# Patient Record
Sex: MALE | Race: AMERICAN INDIAN OR ALASKA NATIVE | NOT HISPANIC OR LATINO | URBAN - METROPOLITAN AREA
[De-identification: names, ages, dates, MRNs, and addresses within clinical notes are randomized per-mention and may not be internally consistent; named-entity substitution may affect disease eponyms.]

---

## 2017-04-12 ENCOUNTER — OUTPATIENT (OUTPATIENT)
Dept: OUTPATIENT SERVICES | Facility: HOSPITAL | Age: 67
LOS: 1 days | Discharge: HOME | End: 2017-04-12

## 2017-06-27 DIAGNOSIS — I25.10 ATHEROSCLEROTIC HEART DISEASE OF NATIVE CORONARY ARTERY WITHOUT ANGINA PECTORIS: ICD-10-CM

## 2017-06-27 DIAGNOSIS — E11.9 TYPE 2 DIABETES MELLITUS WITHOUT COMPLICATIONS: ICD-10-CM

## 2017-06-27 DIAGNOSIS — K76.0 FATTY (CHANGE OF) LIVER, NOT ELSEWHERE CLASSIFIED: ICD-10-CM

## 2017-06-27 DIAGNOSIS — E11.8 TYPE 2 DIABETES MELLITUS WITH UNSPECIFIED COMPLICATIONS: ICD-10-CM

## 2017-06-27 DIAGNOSIS — E78.00 PURE HYPERCHOLESTEROLEMIA, UNSPECIFIED: ICD-10-CM

## 2017-08-16 ENCOUNTER — OUTPATIENT (OUTPATIENT)
Dept: OUTPATIENT SERVICES | Facility: HOSPITAL | Age: 67
LOS: 1 days | Discharge: HOME | End: 2017-08-16

## 2017-10-13 ENCOUNTER — OUTPATIENT (OUTPATIENT)
Dept: OUTPATIENT SERVICES | Facility: HOSPITAL | Age: 67
LOS: 1 days | Discharge: HOME | End: 2017-10-13

## 2017-10-13 DIAGNOSIS — I42.9 CARDIOMYOPATHY, UNSPECIFIED: ICD-10-CM

## 2017-10-13 DIAGNOSIS — E11.8 TYPE 2 DIABETES MELLITUS WITH UNSPECIFIED COMPLICATIONS: ICD-10-CM

## 2017-10-13 DIAGNOSIS — R73.09 OTHER ABNORMAL GLUCOSE: ICD-10-CM

## 2018-02-05 ENCOUNTER — OUTPATIENT (OUTPATIENT)
Dept: OUTPATIENT SERVICES | Facility: HOSPITAL | Age: 68
LOS: 1 days | Discharge: HOME | End: 2018-02-05

## 2018-02-05 DIAGNOSIS — E11.8 TYPE 2 DIABETES MELLITUS WITH UNSPECIFIED COMPLICATIONS: ICD-10-CM

## 2018-02-05 DIAGNOSIS — R53.83 OTHER FATIGUE: ICD-10-CM

## 2018-02-05 DIAGNOSIS — E78.2 MIXED HYPERLIPIDEMIA: ICD-10-CM

## 2018-02-05 DIAGNOSIS — E66.09 OTHER OBESITY DUE TO EXCESS CALORIES: ICD-10-CM

## 2018-05-31 ENCOUNTER — OUTPATIENT (OUTPATIENT)
Dept: OUTPATIENT SERVICES | Facility: HOSPITAL | Age: 68
LOS: 1 days | Discharge: HOME | End: 2018-05-31

## 2018-05-31 DIAGNOSIS — E11.8 TYPE 2 DIABETES MELLITUS WITH UNSPECIFIED COMPLICATIONS: ICD-10-CM

## 2018-09-20 ENCOUNTER — OUTPATIENT (OUTPATIENT)
Dept: OUTPATIENT SERVICES | Facility: HOSPITAL | Age: 68
LOS: 1 days | Discharge: HOME | End: 2018-09-20

## 2018-09-20 DIAGNOSIS — R94.6 ABNORMAL RESULTS OF THYROID FUNCTION STUDIES: ICD-10-CM

## 2018-09-20 DIAGNOSIS — R73.09 OTHER ABNORMAL GLUCOSE: ICD-10-CM

## 2018-09-20 DIAGNOSIS — R68.89 OTHER GENERAL SYMPTOMS AND SIGNS: ICD-10-CM

## 2018-09-20 DIAGNOSIS — N39.0 URINARY TRACT INFECTION, SITE NOT SPECIFIED: ICD-10-CM

## 2018-09-20 DIAGNOSIS — E78.5 HYPERLIPIDEMIA, UNSPECIFIED: ICD-10-CM

## 2018-09-20 DIAGNOSIS — I42.9 CARDIOMYOPATHY, UNSPECIFIED: ICD-10-CM

## 2018-10-17 ENCOUNTER — EMERGENCY (EMERGENCY)
Facility: HOSPITAL | Age: 68
LOS: 0 days | Discharge: HOME | End: 2018-10-17
Admitting: INTERNAL MEDICINE

## 2018-10-17 ENCOUNTER — TRANSCRIPTION ENCOUNTER (OUTPATIENT)
Age: 68
End: 2018-10-17

## 2018-10-17 ENCOUNTER — INPATIENT (INPATIENT)
Facility: HOSPITAL | Age: 68
LOS: 0 days | Discharge: HOME | End: 2018-10-17
Attending: INTERNAL MEDICINE | Admitting: INTERNAL MEDICINE
Payer: MEDICARE

## 2018-10-17 VITALS
SYSTOLIC BLOOD PRESSURE: 186 MMHG | DIASTOLIC BLOOD PRESSURE: 83 MMHG | WEIGHT: 205.03 LBS | RESPIRATION RATE: 18 BRPM | OXYGEN SATURATION: 98 % | TEMPERATURE: 97 F | HEART RATE: 78 BPM | HEIGHT: 66 IN

## 2018-10-17 VITALS
RESPIRATION RATE: 17 BRPM | HEART RATE: 89 BPM | SYSTOLIC BLOOD PRESSURE: 179 MMHG | OXYGEN SATURATION: 95 % | DIASTOLIC BLOOD PRESSURE: 84 MMHG | TEMPERATURE: 98 F

## 2018-10-17 DIAGNOSIS — R04.0 EPISTAXIS: ICD-10-CM

## 2018-10-17 DIAGNOSIS — I10 ESSENTIAL (PRIMARY) HYPERTENSION: ICD-10-CM

## 2018-10-17 DIAGNOSIS — Z79.84 LONG TERM (CURRENT) USE OF ORAL HYPOGLYCEMIC DRUGS: ICD-10-CM

## 2018-10-17 DIAGNOSIS — I25.10 ATHEROSCLEROTIC HEART DISEASE OF NATIVE CORONARY ARTERY WITHOUT ANGINA PECTORIS: ICD-10-CM

## 2018-10-17 DIAGNOSIS — Z95.1 PRESENCE OF AORTOCORONARY BYPASS GRAFT: ICD-10-CM

## 2018-10-17 DIAGNOSIS — E78.00 PURE HYPERCHOLESTEROLEMIA, UNSPECIFIED: ICD-10-CM

## 2018-10-17 DIAGNOSIS — Z79.82 LONG TERM (CURRENT) USE OF ASPIRIN: ICD-10-CM

## 2018-10-17 DIAGNOSIS — E11.9 TYPE 2 DIABETES MELLITUS WITHOUT COMPLICATIONS: ICD-10-CM

## 2018-10-17 DIAGNOSIS — Z95.1 PRESENCE OF AORTOCORONARY BYPASS GRAFT: Chronic | ICD-10-CM

## 2018-10-17 LAB
ANION GAP SERPL CALC-SCNC: 14 MMOL/L — SIGNIFICANT CHANGE UP (ref 7–14)
APTT BLD: 39.8 SEC — HIGH (ref 27–39.2)
BASOPHILS # BLD AUTO: 0.02 K/UL — SIGNIFICANT CHANGE UP (ref 0–0.2)
BASOPHILS NFR BLD AUTO: 0.3 % — SIGNIFICANT CHANGE UP (ref 0–1)
BLD GP AB SCN SERPL QL: SIGNIFICANT CHANGE UP
BUN SERPL-MCNC: 20 MG/DL — SIGNIFICANT CHANGE UP (ref 10–20)
CALCIUM SERPL-MCNC: 9.2 MG/DL — SIGNIFICANT CHANGE UP (ref 8.5–10.1)
CHLORIDE SERPL-SCNC: 98 MMOL/L — SIGNIFICANT CHANGE UP (ref 98–110)
CO2 SERPL-SCNC: 26 MMOL/L — SIGNIFICANT CHANGE UP (ref 17–32)
CREAT SERPL-MCNC: 1.3 MG/DL — SIGNIFICANT CHANGE UP (ref 0.7–1.5)
EOSINOPHIL # BLD AUTO: 0.27 K/UL — SIGNIFICANT CHANGE UP (ref 0–0.7)
EOSINOPHIL NFR BLD AUTO: 3.6 % — SIGNIFICANT CHANGE UP (ref 0–8)
GLUCOSE BLDC GLUCOMTR-MCNC: 139 MG/DL — HIGH (ref 70–99)
GLUCOSE SERPL-MCNC: 108 MG/DL — HIGH (ref 70–99)
HCT VFR BLD CALC: 39.2 % — LOW (ref 42–52)
HCT VFR BLD CALC: 39.3 % — LOW (ref 42–52)
HGB BLD-MCNC: 12.7 G/DL — LOW (ref 14–18)
HGB BLD-MCNC: 12.8 G/DL — LOW (ref 14–18)
IMM GRANULOCYTES NFR BLD AUTO: 0.4 % — HIGH (ref 0.1–0.3)
INR BLD: 1.06 RATIO — SIGNIFICANT CHANGE UP (ref 0.65–1.3)
LYMPHOCYTES # BLD AUTO: 1.01 K/UL — LOW (ref 1.2–3.4)
LYMPHOCYTES # BLD AUTO: 13.5 % — LOW (ref 20.5–51.1)
MCHC RBC-ENTMCNC: 26.7 PG — LOW (ref 27–31)
MCHC RBC-ENTMCNC: 27.5 PG — SIGNIFICANT CHANGE UP (ref 27–31)
MCHC RBC-ENTMCNC: 32.3 G/DL — SIGNIFICANT CHANGE UP (ref 32–37)
MCHC RBC-ENTMCNC: 32.7 G/DL — SIGNIFICANT CHANGE UP (ref 32–37)
MCV RBC AUTO: 82.6 FL — SIGNIFICANT CHANGE UP (ref 80–94)
MCV RBC AUTO: 84.1 FL — SIGNIFICANT CHANGE UP (ref 80–94)
MONOCYTES # BLD AUTO: 0.85 K/UL — HIGH (ref 0.1–0.6)
MONOCYTES NFR BLD AUTO: 11.4 % — HIGH (ref 1.7–9.3)
NEUTROPHILS # BLD AUTO: 5.28 K/UL — SIGNIFICANT CHANGE UP (ref 1.4–6.5)
NEUTROPHILS NFR BLD AUTO: 70.8 % — SIGNIFICANT CHANGE UP (ref 42.2–75.2)
NRBC # BLD: 0 /100 WBCS — SIGNIFICANT CHANGE UP (ref 0–0)
PLATELET # BLD AUTO: 183 K/UL — SIGNIFICANT CHANGE UP (ref 130–400)
PLATELET # BLD AUTO: 187 K/UL — SIGNIFICANT CHANGE UP (ref 130–400)
POTASSIUM SERPL-MCNC: 4.7 MMOL/L — SIGNIFICANT CHANGE UP (ref 3.5–5)
POTASSIUM SERPL-SCNC: 4.7 MMOL/L — SIGNIFICANT CHANGE UP (ref 3.5–5)
PROTHROM AB SERPL-ACNC: 12.2 SEC — SIGNIFICANT CHANGE UP (ref 9.95–12.87)
RBC # BLD: 4.66 M/UL — LOW (ref 4.7–6.1)
RBC # BLD: 4.76 M/UL — SIGNIFICANT CHANGE UP (ref 4.7–6.1)
RBC # FLD: 14.1 % — SIGNIFICANT CHANGE UP (ref 11.5–14.5)
RBC # FLD: 14.3 % — SIGNIFICANT CHANGE UP (ref 11.5–14.5)
SODIUM SERPL-SCNC: 138 MMOL/L — SIGNIFICANT CHANGE UP (ref 135–146)
TYPE + AB SCN PNL BLD: SIGNIFICANT CHANGE UP
WBC # BLD: 7.46 K/UL — SIGNIFICANT CHANGE UP (ref 4.8–10.8)
WBC # BLD: 8.43 K/UL — SIGNIFICANT CHANGE UP (ref 4.8–10.8)
WBC # FLD AUTO: 7.46 K/UL — SIGNIFICANT CHANGE UP (ref 4.8–10.8)
WBC # FLD AUTO: 8.43 K/UL — SIGNIFICANT CHANGE UP (ref 4.8–10.8)

## 2018-10-17 PROCEDURE — 99283 EMERGENCY DEPT VISIT LOW MDM: CPT

## 2018-10-17 RX ORDER — PHENYLEPHRINE HCL 0.25 %
1 AEROSOL, SPRAY WITH PUMP (ML) NASAL ONCE
Qty: 0 | Refills: 0 | Status: COMPLETED | OUTPATIENT
Start: 2018-10-17 | End: 2018-10-17

## 2018-10-17 RX ORDER — LOSARTAN POTASSIUM 100 MG/1
100 TABLET, FILM COATED ORAL DAILY
Qty: 0 | Refills: 0 | Status: DISCONTINUED | OUTPATIENT
Start: 2018-10-17 | End: 2018-10-17

## 2018-10-17 RX ORDER — METFORMIN HYDROCHLORIDE 850 MG/1
500 TABLET ORAL
Qty: 0 | Refills: 0 | Status: DISCONTINUED | OUTPATIENT
Start: 2018-10-17 | End: 2018-10-17

## 2018-10-17 RX ORDER — ACEBUTOLOL HCL 200 MG
1 CAPSULE ORAL
Qty: 0 | Refills: 0 | COMMUNITY

## 2018-10-17 RX ORDER — HYDROCHLOROTHIAZIDE 25 MG
12.5 TABLET ORAL DAILY
Qty: 0 | Refills: 0 | Status: DISCONTINUED | OUTPATIENT
Start: 2018-10-17 | End: 2018-10-17

## 2018-10-17 RX ORDER — ASPIRIN/CALCIUM CARB/MAGNESIUM 324 MG
0 TABLET ORAL
Qty: 0 | Refills: 0 | COMMUNITY

## 2018-10-17 RX ORDER — ATORVASTATIN CALCIUM 80 MG/1
40 TABLET, FILM COATED ORAL AT BEDTIME
Qty: 0 | Refills: 0 | Status: DISCONTINUED | OUTPATIENT
Start: 2018-10-17 | End: 2018-10-17

## 2018-10-17 RX ORDER — TRANEXAMIC ACID 100 MG/ML
1000 INJECTION, SOLUTION INTRAVENOUS ONCE
Qty: 0 | Refills: 0 | Status: COMPLETED | OUTPATIENT
Start: 2018-10-17 | End: 2018-10-17

## 2018-10-17 RX ORDER — ASPIRIN/CALCIUM CARB/MAGNESIUM 324 MG
1 TABLET ORAL
Qty: 0 | Refills: 0 | COMMUNITY

## 2018-10-17 RX ADMIN — TRANEXAMIC ACID 220 MILLIGRAM(S): 100 INJECTION, SOLUTION INTRAVENOUS at 02:10

## 2018-10-17 RX ADMIN — Medication 1 TABLET(S): at 05:22

## 2018-10-17 RX ADMIN — Medication 1 SPRAY(S): at 01:57

## 2018-10-17 NOTE — ED PROVIDER NOTE - MEDICAL DECISION MAKING DETAILS
Patient presented with epistaxis requiring 7.5 double baloon rhinorocket. Patient admitted to the hospital for further management and care. ENT on board.

## 2018-10-17 NOTE — DISCHARGE NOTE ADULT - CARE PROVIDER_API CALL
Berenice Oates), [facility]  [address] [place] [address]  [address] [place] [address]  Phone: [phone]  Fax: [phone]

## 2018-10-17 NOTE — H&P ADULT - ASSESSMENT
69 yo M with pmhx of CAD s/p CABG, DM, HTN presents for L nare epistaxis s/p forceful blowing of nose one hour prior to presentation    # L nare epistaxis s/p packing by ENT  - good hemostasis with the current packing.  - H/H stable  - posterior packing deflated and anterior remains  - follow up with ENT as outpatient on Friday  - c/w po augmentin X 3 day while packing is in, follow up ENT  - hold ASA for now    # CAD s/p CABG  - c/w BB and       # DM  - c/w metformin    # HTN  - c/w losartan and HCTZ    # DVT ppx; hold pharmacological DVT ppx   - no need for GI ppx

## 2018-10-17 NOTE — CHART NOTE - NSCHARTNOTEFT_GEN_A_CORE
68y old Male presented to ER with L nare bleeding which began after he blew his nose. +ASA. This has never happened in the past. Pt also intermittently spitting up some clots. In ER, 7.5cm rhino rocket coated in TXA was placed but pt continued to bleed through packing so a 7.5cm anterior/posterior rhino rocket was placed and inflated with H2O. Pt tolerated well.    Vital Signs Last 24 Hrs  T(C): 36.1 (17 Oct 2018 03:15), Max: 36.1 (17 Oct 2018 03:15)  T(F): 97 (17 Oct 2018 03:15), Max: 97 (17 Oct 2018 03:15)  HR: 70 (17 Oct 2018 03:15) (70 - 78)  BP: 154/80 (17 Oct 2018 03:15) (154/80 - 186/83)  BP(mean): --  RR: 18 (17 Oct 2018 03:15) (18 - 18)  SpO2: 98% (17 Oct 2018 03:15) (98% - 98%)                        12.8   8.43  )-----------( 187      ( 17 Oct 2018 02:28 )             39.2     10-17    138  |  98  |  20  ----------------------------<  108<H>  4.7   |  26  |  1.3    Ca    9.2      17 Oct 2018 02:28      PT/INR - ( 17 Oct 2018 02:28 )   PT: 12.20 sec;   INR: 1.06 ratio         PTT - ( 17 Oct 2018 02:28 )  PTT:39.8 sec  Daily Height in cm: 167.64 (17 Oct 2018 00:36)    Daily       Hypercholesteremia  Diabetes  HTN (hypertension)    Marital Status:  (   )    (   ) Single    (   )    (  )    Lives with: (  ) alone  (  ) children   (  ) spouse   (  ) parents  (  ) other  Substance Use (street drugs): (  ) never used  (  ) other:  Tobacco Usage:  (   ) never smoked   (   ) former smoker   (   ) current smoker  (     ) pack year, see H&P  Alcohol Usage: see H&P  Sexual History: see H&P    REVIEW OF SYSTEMS:  CONSTITUTIONAL: No weakness, fevers or chills  EYES/ENT: (+) nose bleed No visual changes;  No vertigo or throat pain   NECK: No pain or stiffness  RESPIRATORY: No cough, wheezing, hemoptysis; No shortness of breath  CARDIOVASCULAR: No chest pain or palpitations  GASTROINTESTINAL: No abdominal or epigastric pain. No nausea, vomiting, or hematemesis; No diarrhea or constipation. No melena or hematochezia.  GENITOURINARY: No dysuria, frequency or hematuria  NEUROLOGICAL: No numbness or weakness  SKIN: No itching, rashes    Physical Exam:  General: WN/WD NAD  Neurology: A&Ox3, nonfocal, follows commands  Eyes: PERRLA/ EOMI  ENT/Neck: Neck supple, trachea midline, No JVD, (+) L nasal rhinorocket packing  Respiratory: CTA B/L, No wheezing, rales, rhonchi  CV: Normal rate regular rhythm, S1S2, no murmurs, rubs or gallops  Abdominal: Soft, NT, ND +BS,   Extremities: No edema, + peripheral pulses  Skin: No Rashes, Hematoma, Ecchymosis  Incisions:   Tubes:    EKG:  CXR:   Type and Screen:     A/P  Epistaxis on ASA s/p posterior nasal packing   -hold ASA  -ENT follow up   -repeat CBC    DM/Hypercholersterol/HTN   -stable on current outpatient Rx    SCD to LE if in bed    -IF cleared by ENT can be discharged to F/U w/ ENT and PMD 68y old Male presented to ER with L nare bleeding which began after he blew his nose. +ASA. This has never happened in the past. Pt also intermittently spitting up some clots. In ER, 7.5cm rhino rocket coated in TXA was placed but pt continued to bleed through packing so a 7.5cm anterior/posterior rhino rocket was placed and inflated with H2O. Pt tolerated well.    Vital Signs Last 24 Hrs  T(C): 36.1 (17 Oct 2018 03:15), Max: 36.1 (17 Oct 2018 03:15)  T(F): 97 (17 Oct 2018 03:15), Max: 97 (17 Oct 2018 03:15)  HR: 70 (17 Oct 2018 03:15) (70 - 78)  BP: 154/80 (17 Oct 2018 03:15) (154/80 - 186/83)  BP(mean): --  RR: 18 (17 Oct 2018 03:15) (18 - 18)  SpO2: 98% (17 Oct 2018 03:15) (98% - 98%)                        12.8   8.43  )-----------( 187      ( 17 Oct 2018 02:28 )             39.2     10-17    138  |  98  |  20  ----------------------------<  108<H>  4.7   |  26  |  1.3    Ca    9.2      17 Oct 2018 02:28      PT/INR - ( 17 Oct 2018 02:28 )   PT: 12.20 sec;   INR: 1.06 ratio         PTT - ( 17 Oct 2018 02:28 )  PTT:39.8 sec  Daily Height in cm: 167.64 (17 Oct 2018 00:36)    Daily       Hypercholesteremia  Diabetes  HTN (hypertension)    Marital Status:  (   )    (   ) Single    (   )    (  )    Lives with: (  ) alone  (  ) children   (  ) spouse   (  ) parents  (  ) other  Substance Use (street drugs): (  ) never used  (  ) other:  Tobacco Usage:  (   ) never smoked   (   ) former smoker   (   ) current smoker  (     ) pack year, see H&P  Alcohol Usage: see H&P  Sexual History: see H&P    REVIEW OF SYSTEMS:  CONSTITUTIONAL: No weakness, fevers or chills  EYES/ENT: (+) nose bleed No visual changes;  No vertigo or throat pain   NECK: No pain or stiffness  RESPIRATORY: No cough, wheezing, hemoptysis; No shortness of breath  CARDIOVASCULAR: No chest pain or palpitations  GASTROINTESTINAL: No abdominal or epigastric pain. No nausea, vomiting, or hematemesis; No diarrhea or constipation. No melena or hematochezia.  GENITOURINARY: No dysuria, frequency or hematuria  NEUROLOGICAL: No numbness or weakness  SKIN: No itching, rashes    Physical Exam:  General: WN/WD NAD  Neurology: A&Ox3, nonfocal, follows commands  Eyes: PERRLA/ EOMI  ENT/Neck: Neck supple, trachea midline, No JVD, (+) L nasal rhinorocket packing  Respiratory: CTA B/L, No wheezing, rales, rhonchi  CV: Normal rate regular rhythm, S1S2, no murmurs, rubs or gallops  Abdominal: Soft, NT, ND +BS,   Extremities: No edema, + peripheral pulses  Skin: No Rashes, Hematoma, Ecchymosis  Incisions:   Tubes:    EKG:  CXR:   Type and Screen:     A/P  Epistaxis on ASA s/p posterior nasal packing   -hold ASA  -ENT follow up   -repeat CBC  -c/w Augmentin    DM/Hypercholersterol/HTN   -stable on current outpatient Rx    SCD to LE if in bed    -IF cleared by ENT can be discharged to F/U w/ ENT and PMD

## 2018-10-17 NOTE — PROGRESS NOTE ADULT - SUBJECTIVE AND OBJECTIVE BOX
ENT DAILY PROGRESS NOTE    Overnight events/Interval HPI:   67 y/o male with L nare epistaxis s/p forceful blowing of nose. Required a posterior packing due to uncontrolled bleeding last night which was soaked in TXA. Seen and examined at bedside, reports good hemostasis with the current packing. Had some leaking over dressing. Denies acute bleed, no post nasal drip. Denies fevers chills, N/V.            Allergies    No Known Allergies    Intolerances        MEDICATIONS:  Antiinfectives:     IV fluids:    Hematologic/Anticoagulation:    Pain medications/Neuro:    Endocrine Medications:     All other standing medications:     All other PRN medications:      Vital Signs Last 24 Hrs  T(C): 36.7 (17 Oct 2018 08:07), Max: 36.7 (17 Oct 2018 08:07)  T(F): 98 (17 Oct 2018 08:07), Max: 98 (17 Oct 2018 08:07)  HR: 69 (17 Oct 2018 08:07) (69 - 78)  BP: 170/78 (17 Oct 2018 08:07) (154/80 - 186/83)  BP(mean): --  RR: 18 (17 Oct 2018 08:07) (18 - 18)  SpO2: 98% (17 Oct 2018 08:07) (98% - 98%)          PHYSICAL EXAM:    ENT EXAM-   Constitutional: Well-developed, well-nourished.  No hoarseness. awake/alert  Nose:  + 10cm posterior packing in place in L nare. + dry blood around it, no oozing, No active bleed.   OC/OP:  No streaking of blood in post oropharynx, no clot  Neck:  Trachea midline.  Thyroid, parotid and submandibular glands normal.  Lymph:  No cervical adenopathy.    LABS:  CBC-                        12.8   8.43  )-----------( 187      ( 17 Oct 2018 02:28 )             39.2     BMP/CMP-  17 Oct 2018 02:28    138    |  98     |  20     ----------------------------<  108    4.7     |  26     |  1.3      Ca    9.2        17 Oct 2018 02:28      Coagulation Studies-  PT/INR - ( 17 Oct 2018 02:28 )   PT: 12.20 sec;   INR: 1.06 ratio         PTT - ( 17 Oct 2018 02:28 )  PTT:39.8 sec  Endocrine Panel-  Calcium, Total Serum: 9.2 mg/dL (10-17 @ 02:28)              RADIOLOGY & ADDITIONAL STUDIES:

## 2018-10-17 NOTE — CONSULT NOTE ADULT - SUBJECTIVE AND OBJECTIVE BOX
68y old Male presented to ER with L nare bleeding which began after he blew his nose. +ASA. This has never happened in the past. Pt also intermittently spitting up some clots. In ER, 7.5cm rhino rocket coated in TXA was placed but pt continued to bleed through packing so a 7.5cm anterior/posterior rhino rocket was placed and inflated with H2O. Pt tolerated well.    PAST MEDICAL & SURGICAL HISTORY:  Hypercholesteremia  Diabetes  HTN (hypertension)    MEDS: tranexamic acid IVPB 1000 milliGRAM(s)   MEDICATIONS  (STANDING):  tranexamic acid IVPB 1000 milliGRAM(s) IV Intermittent Once    MEDICATIONS  (PRN):    ALLERGIES: No Known Allergies    VS: T(F): 96.6, Max: 96.6 (10- @ 00:36)  HR: 78 (78 - 78)  BP: 186/83 (186/83 - 186/83)  RR: 18  SpO2: 98% (98% - 98%)  GEN: Alert, awake, NAD  HEENT: some blood streaking in posterior OP, no active bleeding noted, uvula midline, L nare with packing in place, some oozing anteriorly    LABS/IMAGIN.8   8.43  )-----------( 187      ( 17 Oct 2018 02:28 )             39.2     10-17    138  |  98  |  20  ----------------------------<  108<H>  4.7   |  26  |  1.3    Ca    9.2      17 Oct 2018 02:28      PT/INR - ( 17 Oct 2018 02:28 )   PT: 12.20 sec;   INR: 1.06 ratio  PTT - ( 17 Oct 2018 02:28 )  PTT:39.8 sec

## 2018-10-17 NOTE — DISCHARGE NOTE ADULT - PATIENT PORTAL LINK FT
You can access the [facility] Patient Portal, offered by [facility], by registering with the following website: [email or web address]

## 2018-10-17 NOTE — H&P ADULT - NSHPLABSRESULTS_GEN_ALL_CORE
T(C): 36.7 (10-17-18 @ 08:07), Max: 36.7 (10-17-18 @ 08:07)  T(F): 98 (10-17-18 @ 08:07), Max: 98 (10-17-18 @ 08:07)  HR: 69 (10-17-18 @ 08:07) (69 - 78)  BP: 170/78 (10-17-18 @ 08:07) (154/80 - 186/83)  RR: 18 (10-17-18 @ 08:07) (18 - 18)  SpO2: 98% (10-17-18 @ 08:07) (98% - 98%)  Labs:                         12.7<L>  7.46    )-----------(   183      ( 17 Oct 2018 12:39 )              39.3<L>    Neutro%  70.8    Lympho%  13.5<L>   Mono%    11.4<H>   Bands    x        10-17    138  |  98  |  20  ----------------------------<  108<H>  4.7   |  26  |  1.3    Ca    9.2      17 Oct 2018 02:28      eGFR if Non African American: 55 mL/min/1.73M2 (10-17-18 @ 02:28)  eGFR if : 64 mL/min/1.73M2 (10-17-18 @ 02:28)      ( 17 Oct 2018 02:28 )   PT: 12.20 sec;   INR: 1.06 ratio;       PTT:39.8 sec

## 2018-10-17 NOTE — ED PROVIDER NOTE - OBJECTIVE STATEMENT
spontaneous left sided nose bleed after blowing nose one hour ago. Pt takes baby ASA daily. DEnies other complaints spontaneous left sided nose bleed after blowing nose one hour ago. Pt takes baby ASA daily. Denies other complaints

## 2018-10-17 NOTE — H&P ADULT - NSHPPHYSICALEXAM_GEN_ALL_CORE
GENERAL: NAD, well-developed  HEAD:  Atraumatic, Normocephalic. + L nare packing  EYES: EOMI, PERRLA, conjunctiva and sclera clear  NECK: Supple  CHEST/LUNG: Clear to auscultation bilaterally; No wheeze  HEART: Regular rate and rhythm; No murmurs  ABDOMEN: Soft, Nontender, Nondistended; Bowel sounds present  EXTREMITIES: No clubbing, cyanosis, or edema  PSYCH: AAOx3  NEUROLOGY: non-focal  SKIN: No rashes or lesions

## 2018-10-17 NOTE — DISCHARGE NOTE ADULT - CARE PLAN
Principal Discharge DX:	Posterior epistaxis  Goal:	symptoms resolution  Assessment and plan of treatment:	Please take antibiotics while packing is in and follow up with ENT on Friday. Principal Discharge DX:	Posterior epistaxis  Goal:	symptoms resolution  Assessment and plan of treatment:	Please take antibiotics while packing is in and follow up with ENT on Friday. And please follow up with your PMD/ENT when to resume taking aspirin.

## 2018-10-17 NOTE — ED ADULT NURSE NOTE - NSIMPLEMENTINTERV_GEN_ALL_ED
Implemented All Universal Safety Interventions:  [place] to call system. Call bell, personal items and telephone within reach. Instruct patient to call for assistance. Room bathroom lighting operational. Non-slip footwear when patient is off stretcher. Physically safe environment: no spills, clutter or unnecessary equipment. Stretcher in lowest position, wheels locked, appropriate side rails in place.

## 2018-10-17 NOTE — PROGRESS NOTE ADULT - ASSESSMENT
69 y/o male with L nare epistaxis on ASA  - Hold ASA  - Abx for as long as packing remains in place  - monitor for rebleed  - control BP  - packing to remain in place for 48 hrs  - will d/w attending 67 y/o male with L nare epistaxis on ASA  - Hold ASA  - Abx for as long as packing remains in place  - monitor for rebleed  - control BP  - packing to remain in place for 48 hrs  - posterior packing deflated, anterior remains.  - f/u with Dr. Oates as o/p on friday for packing removal  - d/w Dr. Oates

## 2018-10-17 NOTE — DISCHARGE NOTE ADULT - MEDICATION SUMMARY - MEDICATIONS TO TAKE
I will START or STAY ON the medications listed below when I get home from the hospital:    acebutolol 200 mg oral capsule  -- 1 cap(s) by mouth 2 times a day  -- Indication: For CAD (coronary artery disease)    metFORMIN 500 mg oral tablet  -- 1 tab(s) by mouth 2 times a day  -- Indication: For DM    glimepiride 1 mg oral tablet  -- 1 tab(s) by mouth once a day  -- Indication: For DM    atorvastatin 40 mg oral tablet  -- 1 tab(s) by mouth once a day  -- Indication: For CAD (coronary artery disease)    losartan-hydrochlorothiazide 100mg-12.5mg oral tablet  -- 1 tab(s) by mouth once a day  -- Indication: For HTN (hypertension)    amoxicillin-clavulanate 875 mg-125 mg oral tablet  -- 1 tab(s) by mouth 2 times a day  -- Indication: For Nasal packing

## 2018-10-17 NOTE — CONSULT NOTE ADULT - ASSESSMENT
68y old Male on ASA with L nare epistaxis currently controlled with anterior/posterior nasal packing.     PLAN:  1.	Hold ASA  2.	Please start Augmentin for TSS ppx until packing is removed  3.	Continue to monitor for re-bleeding  4.	Keep normotensive  5.	Maintain packing for 48-72 hrs 68y old Male on ASA with L nare epistaxis currently controlled with anterior/posterior nasal packing.     PLAN:  1.	Hold ASA  2.	Please start Augmentin for TSS ppx until packing is removed  3.	Continue to monitor for re-bleeding  4.	Can change mustache dressing prn   5.	Keep normotensive  6.	Maintain packing for 48-72 hrs

## 2018-10-17 NOTE — DISCHARGE NOTE ADULT - HOSPITAL COURSE
67 yo F with pmxh of DM, HTN and CAD s/p CABG presents for L nare epistaxis, seen by ENT in ED and s/p packing. Packing achieved good hemostasis and H/H stable. Pt is ready for DC and follow up with Dr. Oates, ENT on Friday. 69 yo F with pmxh of DM, HTN and CAD s/p CABG presents for L nare epistaxis, seen by ENT in ED and s/p packing. Packing achieved good hemostasis and H/H stable. Pt is ready for DC and follow up with Dr. Oates, ENT on Friday.    Dr Mena addendum:   PAtient seen/examined independently. Agree with Dr. Kramer's note.  D/c to home.   F/u ENT  complete Abx. 69 yo F with pmxh of DM, HTN and CAD s/p CABG presents for L nare epistaxis, seen by ENT in ED and s/p packing. Packing achieved good hemostasis and H/H stable. Pt is ready for DC and follow up with Dr. Oates ENT on Friday.    Attending Note:    Subjective: patient seen and examined at bedside. Has nasal packing removed by ENT today. Denies recurrent epistaxis     Vital Signs Last 24 Hrs  T(C): 36.5 (17 Oct 2018 15:24), Max: 36.7 (17 Oct 2018 08:07)  T(F): 97.7 (17 Oct 2018 15:24), Max: 98 (17 Oct 2018 08:07)  HR: 89 (17 Oct 2018 15:24) (69 - 89)  BP: 179/84 (17 Oct 2018 15:24) (154/80 - 186/83)  BP(mean): --  RR: 17 (17 Oct 2018 15:24) (17 - 18)  SpO2: 95% (17 Oct 2018 15:24) (95% - 98%)  PHYSICAL EXAM:    Constitutional: middle age male, resting in bed, in no apparent distress, AAOx3  HEENT: L nasal packing deflated, residual dried blood in nostril, no signs of active bleeding   Respiratory: Breath sounds are clear bilaterally, No wheezing, rales or rhonchi  Cardiovascular: S1 and S2, regular rate and rhythm, no Murmurs, gallops or rubs  Gastrointestinal: Bowel Sounds present, obese, soft, nontender, nondistended, no guarding  Extremities: No peripheral edema    A/P: 69 yo M with PMHx of DM, HTN and CAD s/p CABG presents for L nare epistaxis, ENT placed nasal packing overnight which was then deflated after good hemostasis was achieved. ASA held due to active bleed. Cbc repeated and H/H stable. Pt cleared by ENT for d/c and instructed to follow up with GUILLE Barahona on Friday. ASA held per ENT recs. Pt instructed to f/u w/ ENT and PCP to restart ASA given cardiac history. D/c on Augmentin until nasal packing is removed on Friday. Stable for d/c.      Dr Mena addendum:   PAtient seen/examined independently. Agree with Dr. Kramer's note.  D/c to home.   F/u ENT  complete Abx.

## 2018-10-17 NOTE — H&P ADULT - NSHPREVIEWOFSYSTEMS_GEN_ALL_CORE
Review of Systems:   CONSTITUTIONAL: No fever, chills  EYES: No eye pain, visual disturbances, or discharge  ENMT:  No difficulty hearing, tinnitus, vertigo; + L nare bleeding, s/p packing  NECK: No pain or stiffness  RESPIRATORY: No cough, wheezing, chills or hemoptysis; No shortness of breath  CARDIOVASCULAR: No chest pain, palpitations, dizziness  GASTROINTESTINAL: No abdominal or epigastric pain. No nausea, vomiting; No diarrhea or constipation. No melena or hematochezia.  GENITOURINARY: No dysuria  NEUROLOGICAL: No headaches, dizziness, weakness  SKIN: No itching, burning, rashes, or lesions   MUSCULOSKELETAL: No joint pain or swelling; No muscle, back, or extremity pain  HEME/LYMPH: No easy bruising, or bleeding gums

## 2018-10-17 NOTE — ED PROVIDER NOTE - PROGRESS NOTE DETAILS
Bleeding began again. Phenylephrine sprayed into left nostril as well as soaked cotton ball inserted. Direct pressure with tongue depressors applied. Will reassess in 20 mins. Will pack if still bleeding Nasal packing with 7.5cm rhinorocket placed into left nare but pt continues to bleed aroung the packing. ENT was called and will come evaluate the pt ENT saw pt. Posterior packing placed. Bleeding has stopped. ENT wants observation and will reevaluate pt in one and half hours. Will transfer to crit care. Patient still has some evidence of slow bleeding from the left nostril. Case discussed with ENT. Will admit to monitored bed due to posterior packing in place. Pt continuing to bleed despite packing. Will admit for further monitoring and ENT intervention.

## 2018-10-17 NOTE — DISCHARGE NOTE ADULT - PLAN OF CARE
symptoms resolution Please take antibiotics while packing is in and follow up with ENT on Friday. Please take antibiotics while packing is in and follow up with ENT on Friday. And please follow up with your PMD/ENT when to resume taking aspirin.

## 2018-10-17 NOTE — ED PROVIDER NOTE - ATTENDING CONTRIBUTION TO CARE
I personally evaluated the patient. I reviewed the Resident’s or Physician Assistant’s note (as assigned above), and agree with the findings and plan except as documented in my note.    69 y/o M with hx of DM, CABG on aspirin presents with spontaneous left sided epistaxis after blowing his nose tonight. No trauma. No anticoagulation. No HA. No n/v.    Plan: packing with rhinorocket, reassess

## 2018-10-18 ENCOUNTER — APPOINTMENT (OUTPATIENT)
Dept: OTOLARYNGOLOGY | Facility: CLINIC | Age: 68
End: 2018-10-18
Payer: MEDICARE

## 2018-10-18 VITALS — DIASTOLIC BLOOD PRESSURE: 79 MMHG | HEIGHT: 66 IN | SYSTOLIC BLOOD PRESSURE: 128 MMHG

## 2018-10-18 VITALS — WEIGHT: 200 LBS | BODY MASS INDEX: 32.28 KG/M2

## 2018-10-18 DIAGNOSIS — Z80.7 FAMILY HISTORY OF OTHER MALIGNANT NEOPLASMS OF LYMPHOID, HEMATOPOIETIC AND RELATED TISSUES: ICD-10-CM

## 2018-10-18 DIAGNOSIS — Z78.9 OTHER SPECIFIED HEALTH STATUS: ICD-10-CM

## 2018-10-18 DIAGNOSIS — I10 ESSENTIAL (PRIMARY) HYPERTENSION: ICD-10-CM

## 2018-10-18 DIAGNOSIS — E11.9 TYPE 2 DIABETES MELLITUS W/OUT COMPLICATIONS: ICD-10-CM

## 2018-10-18 DIAGNOSIS — Z87.891 PERSONAL HISTORY OF NICOTINE DEPENDENCE: ICD-10-CM

## 2018-10-18 PROBLEM — E78.00 PURE HYPERCHOLESTEROLEMIA, UNSPECIFIED: Chronic | Status: ACTIVE | Noted: 2018-10-17

## 2018-10-18 PROBLEM — Z00.00 ENCOUNTER FOR PREVENTIVE HEALTH EXAMINATION: Status: ACTIVE | Noted: 2018-10-18

## 2018-10-18 PROCEDURE — 99214 OFFICE O/P EST MOD 30 MIN: CPT | Mod: 25

## 2018-10-18 PROCEDURE — 31237 NSL/SINS NDSC SURG BX POLYPC: CPT

## 2018-11-19 ENCOUNTER — OUTPATIENT (OUTPATIENT)
Dept: OUTPATIENT SERVICES | Facility: HOSPITAL | Age: 68
LOS: 1 days | Discharge: HOME | End: 2018-11-19

## 2018-11-19 DIAGNOSIS — I10 ESSENTIAL (PRIMARY) HYPERTENSION: ICD-10-CM

## 2018-11-19 DIAGNOSIS — Z95.1 PRESENCE OF AORTOCORONARY BYPASS GRAFT: Chronic | ICD-10-CM

## 2018-11-19 DIAGNOSIS — R94.5 ABNORMAL RESULTS OF LIVER FUNCTION STUDIES: ICD-10-CM

## 2018-11-19 DIAGNOSIS — E11.9 TYPE 2 DIABETES MELLITUS WITHOUT COMPLICATIONS: ICD-10-CM

## 2018-11-19 PROBLEM — I25.10 ATHEROSCLEROTIC HEART DISEASE OF NATIVE CORONARY ARTERY WITHOUT ANGINA PECTORIS: Chronic | Status: ACTIVE | Noted: 2018-10-17

## 2018-12-14 ENCOUNTER — OUTPATIENT (OUTPATIENT)
Dept: OUTPATIENT SERVICES | Facility: HOSPITAL | Age: 68
LOS: 1 days | Discharge: HOME | End: 2018-12-14

## 2018-12-14 DIAGNOSIS — E11.65 TYPE 2 DIABETES MELLITUS WITH HYPERGLYCEMIA: ICD-10-CM

## 2018-12-14 DIAGNOSIS — E78.5 HYPERLIPIDEMIA, UNSPECIFIED: ICD-10-CM

## 2018-12-14 DIAGNOSIS — Z95.1 PRESENCE OF AORTOCORONARY BYPASS GRAFT: Chronic | ICD-10-CM

## 2018-12-14 DIAGNOSIS — I10 ESSENTIAL (PRIMARY) HYPERTENSION: ICD-10-CM

## 2019-03-05 ENCOUNTER — OUTPATIENT (OUTPATIENT)
Dept: OUTPATIENT SERVICES | Facility: HOSPITAL | Age: 69
LOS: 1 days | Discharge: HOME | End: 2019-03-05

## 2019-03-05 DIAGNOSIS — E78.2 MIXED HYPERLIPIDEMIA: ICD-10-CM

## 2019-03-05 DIAGNOSIS — I10 ESSENTIAL (PRIMARY) HYPERTENSION: ICD-10-CM

## 2019-03-05 DIAGNOSIS — Z95.1 PRESENCE OF AORTOCORONARY BYPASS GRAFT: Chronic | ICD-10-CM

## 2019-03-05 DIAGNOSIS — R73.09 OTHER ABNORMAL GLUCOSE: ICD-10-CM

## 2019-06-20 ENCOUNTER — OUTPATIENT (OUTPATIENT)
Dept: OUTPATIENT SERVICES | Facility: HOSPITAL | Age: 69
LOS: 1 days | Discharge: HOME | End: 2019-06-20

## 2019-06-20 DIAGNOSIS — E11.8 TYPE 2 DIABETES MELLITUS WITH UNSPECIFIED COMPLICATIONS: ICD-10-CM

## 2019-06-20 DIAGNOSIS — Z95.1 PRESENCE OF AORTOCORONARY BYPASS GRAFT: Chronic | ICD-10-CM

## 2019-06-20 DIAGNOSIS — E78.5 HYPERLIPIDEMIA, UNSPECIFIED: ICD-10-CM

## 2019-06-20 DIAGNOSIS — E78.2 MIXED HYPERLIPIDEMIA: ICD-10-CM

## 2019-06-20 DIAGNOSIS — I10 ESSENTIAL (PRIMARY) HYPERTENSION: ICD-10-CM

## 2019-06-20 DIAGNOSIS — E66.09 OTHER OBESITY DUE TO EXCESS CALORIES: ICD-10-CM

## 2019-06-20 DIAGNOSIS — E06.9 THYROIDITIS, UNSPECIFIED: ICD-10-CM

## 2019-06-20 DIAGNOSIS — D44.0 NEOPLASM OF UNCERTAIN BEHAVIOR OF THYROID GLAND: ICD-10-CM

## 2019-06-20 DIAGNOSIS — I42.9 CARDIOMYOPATHY, UNSPECIFIED: ICD-10-CM

## 2019-06-20 DIAGNOSIS — R80.9 PROTEINURIA, UNSPECIFIED: ICD-10-CM

## 2019-06-20 DIAGNOSIS — R73.09 OTHER ABNORMAL GLUCOSE: ICD-10-CM

## 2019-06-20 DIAGNOSIS — R68.89 OTHER GENERAL SYMPTOMS AND SIGNS: ICD-10-CM

## 2019-06-20 DIAGNOSIS — E53.9 VITAMIN B DEFICIENCY, UNSPECIFIED: ICD-10-CM

## 2019-06-20 DIAGNOSIS — E55.9 VITAMIN D DEFICIENCY, UNSPECIFIED: ICD-10-CM

## 2019-06-20 DIAGNOSIS — E83.32 HEREDITARY VITAMIN D-DEPENDENT RICKETS (TYPE 1) (TYPE 2): ICD-10-CM

## 2019-06-20 DIAGNOSIS — D51.0 VITAMIN B12 DEFICIENCY ANEMIA DUE TO INTRINSIC FACTOR DEFICIENCY: ICD-10-CM

## 2019-06-20 DIAGNOSIS — N39.0 URINARY TRACT INFECTION, SITE NOT SPECIFIED: ICD-10-CM

## 2019-09-30 ENCOUNTER — OUTPATIENT (OUTPATIENT)
Dept: OUTPATIENT SERVICES | Facility: HOSPITAL | Age: 69
LOS: 1 days | Discharge: HOME | End: 2019-09-30

## 2019-09-30 DIAGNOSIS — E06.9 THYROIDITIS, UNSPECIFIED: ICD-10-CM

## 2019-09-30 DIAGNOSIS — E55.9 VITAMIN D DEFICIENCY, UNSPECIFIED: ICD-10-CM

## 2019-09-30 DIAGNOSIS — Z95.1 PRESENCE OF AORTOCORONARY BYPASS GRAFT: Chronic | ICD-10-CM

## 2019-09-30 DIAGNOSIS — D53.9 NUTRITIONAL ANEMIA, UNSPECIFIED: ICD-10-CM

## 2019-09-30 DIAGNOSIS — I10 ESSENTIAL (PRIMARY) HYPERTENSION: ICD-10-CM

## 2019-09-30 DIAGNOSIS — E78.5 HYPERLIPIDEMIA, UNSPECIFIED: ICD-10-CM

## 2019-09-30 DIAGNOSIS — E11.8 TYPE 2 DIABETES MELLITUS WITH UNSPECIFIED COMPLICATIONS: ICD-10-CM

## 2020-12-20 ENCOUNTER — INPATIENT (INPATIENT)
Facility: HOSPITAL | Age: 70
LOS: 3 days | Discharge: HOME | End: 2020-12-24
Attending: HOSPITALIST | Admitting: HOSPITALIST
Payer: MEDICARE

## 2020-12-20 VITALS
WEIGHT: 201.94 LBS | OXYGEN SATURATION: 97 % | TEMPERATURE: 98 F | DIASTOLIC BLOOD PRESSURE: 61 MMHG | RESPIRATION RATE: 18 BRPM | HEIGHT: 66 IN | SYSTOLIC BLOOD PRESSURE: 121 MMHG | HEART RATE: 86 BPM

## 2020-12-20 DIAGNOSIS — Z95.1 PRESENCE OF AORTOCORONARY BYPASS GRAFT: Chronic | ICD-10-CM

## 2020-12-20 LAB
ALBUMIN SERPL ELPH-MCNC: 4.2 G/DL — SIGNIFICANT CHANGE UP (ref 3.5–5.2)
ALP SERPL-CCNC: 74 U/L — SIGNIFICANT CHANGE UP (ref 30–115)
ALT FLD-CCNC: 26 U/L — SIGNIFICANT CHANGE UP (ref 0–41)
ANION GAP SERPL CALC-SCNC: 13 MMOL/L — SIGNIFICANT CHANGE UP (ref 7–14)
APTT BLD: 35.5 SEC — SIGNIFICANT CHANGE UP (ref 27–39.2)
AST SERPL-CCNC: 27 U/L — SIGNIFICANT CHANGE UP (ref 0–41)
BASOPHILS # BLD AUTO: 0.04 K/UL — SIGNIFICANT CHANGE UP (ref 0–0.2)
BASOPHILS NFR BLD AUTO: 0.5 % — SIGNIFICANT CHANGE UP (ref 0–1)
BILIRUB SERPL-MCNC: 0.4 MG/DL — SIGNIFICANT CHANGE UP (ref 0.2–1.2)
BUN SERPL-MCNC: 27 MG/DL — HIGH (ref 10–20)
CALCIUM SERPL-MCNC: 9.9 MG/DL — SIGNIFICANT CHANGE UP (ref 8.5–10.1)
CHLORIDE SERPL-SCNC: 100 MMOL/L — SIGNIFICANT CHANGE UP (ref 98–110)
CO2 SERPL-SCNC: 24 MMOL/L — SIGNIFICANT CHANGE UP (ref 17–32)
CREAT SERPL-MCNC: 2 MG/DL — HIGH (ref 0.7–1.5)
EOSINOPHIL # BLD AUTO: 0.42 K/UL — SIGNIFICANT CHANGE UP (ref 0–0.7)
EOSINOPHIL NFR BLD AUTO: 5.4 % — SIGNIFICANT CHANGE UP (ref 0–8)
GLUCOSE SERPL-MCNC: 252 MG/DL — HIGH (ref 70–99)
HCT VFR BLD CALC: 40.5 % — LOW (ref 42–52)
HGB BLD-MCNC: 13 G/DL — LOW (ref 14–18)
IMM GRANULOCYTES NFR BLD AUTO: 0.6 % — HIGH (ref 0.1–0.3)
INR BLD: 1.1 RATIO — SIGNIFICANT CHANGE UP (ref 0.65–1.3)
LYMPHOCYTES # BLD AUTO: 1.38 K/UL — SIGNIFICANT CHANGE UP (ref 1.2–3.4)
LYMPHOCYTES # BLD AUTO: 17.6 % — LOW (ref 20.5–51.1)
MCHC RBC-ENTMCNC: 26.8 PG — LOW (ref 27–31)
MCHC RBC-ENTMCNC: 32.1 G/DL — SIGNIFICANT CHANGE UP (ref 32–37)
MCV RBC AUTO: 83.5 FL — SIGNIFICANT CHANGE UP (ref 80–94)
MONOCYTES # BLD AUTO: 0.76 K/UL — HIGH (ref 0.1–0.6)
MONOCYTES NFR BLD AUTO: 9.7 % — HIGH (ref 1.7–9.3)
NEUTROPHILS # BLD AUTO: 5.2 K/UL — SIGNIFICANT CHANGE UP (ref 1.4–6.5)
NEUTROPHILS NFR BLD AUTO: 66.2 % — SIGNIFICANT CHANGE UP (ref 42.2–75.2)
NRBC # BLD: 0 /100 WBCS — SIGNIFICANT CHANGE UP (ref 0–0)
PLATELET # BLD AUTO: 246 K/UL — SIGNIFICANT CHANGE UP (ref 130–400)
POTASSIUM SERPL-MCNC: 4.5 MMOL/L — SIGNIFICANT CHANGE UP (ref 3.5–5)
POTASSIUM SERPL-SCNC: 4.5 MMOL/L — SIGNIFICANT CHANGE UP (ref 3.5–5)
PROT SERPL-MCNC: 7.6 G/DL — SIGNIFICANT CHANGE UP (ref 6–8)
PROTHROM AB SERPL-ACNC: 12.6 SEC — SIGNIFICANT CHANGE UP (ref 9.95–12.87)
RAPID RVP RESULT: DETECTED
RBC # BLD: 4.85 M/UL — SIGNIFICANT CHANGE UP (ref 4.7–6.1)
RBC # FLD: 14.1 % — SIGNIFICANT CHANGE UP (ref 11.5–14.5)
SARS-COV-2 RNA SPEC QL NAA+PROBE: DETECTED
SODIUM SERPL-SCNC: 137 MMOL/L — SIGNIFICANT CHANGE UP (ref 135–146)
TROPONIN T SERPL-MCNC: <0.01 NG/ML — SIGNIFICANT CHANGE UP
WBC # BLD: 7.85 K/UL — SIGNIFICANT CHANGE UP (ref 4.8–10.8)
WBC # FLD AUTO: 7.85 K/UL — SIGNIFICANT CHANGE UP (ref 4.8–10.8)

## 2020-12-20 PROCEDURE — 70498 CT ANGIOGRAPHY NECK: CPT | Mod: 26

## 2020-12-20 PROCEDURE — 99285 EMERGENCY DEPT VISIT HI MDM: CPT | Mod: CS

## 2020-12-20 PROCEDURE — 93010 ELECTROCARDIOGRAM REPORT: CPT

## 2020-12-20 PROCEDURE — 70496 CT ANGIOGRAPHY HEAD: CPT | Mod: 26

## 2020-12-20 PROCEDURE — 70450 CT HEAD/BRAIN W/O DYE: CPT | Mod: 26,59

## 2020-12-20 RX ORDER — AMPICILLIN SODIUM AND SULBACTAM SODIUM 250; 125 MG/ML; MG/ML
3 INJECTION, POWDER, FOR SUSPENSION INTRAMUSCULAR; INTRAVENOUS ONCE
Refills: 0 | Status: COMPLETED | OUTPATIENT
Start: 2020-12-20 | End: 2020-12-20

## 2020-12-20 RX ORDER — VALACYCLOVIR 500 MG/1
1000 TABLET, FILM COATED ORAL ONCE
Refills: 0 | Status: COMPLETED | OUTPATIENT
Start: 2020-12-20 | End: 2020-12-20

## 2020-12-20 RX ADMIN — AMPICILLIN SODIUM AND SULBACTAM SODIUM 200 GRAM(S): 250; 125 INJECTION, POWDER, FOR SUSPENSION INTRAMUSCULAR; INTRAVENOUS at 22:07

## 2020-12-20 NOTE — ED ADULT TRIAGE NOTE - CHIEF COMPLAINT QUOTE
PT c/o PT c/o L eye twitching starting yesterday and today pt began to have left facial droop and slurred speech.

## 2020-12-20 NOTE — ED PROVIDER NOTE - PROGRESS NOTE DETAILS
SC: Code stroke called with prenote. Likely bells palsy per neurology. No indication for TPA at this time. Prednisone and valtrex ordered. Neuro requesting MR brain noncon, MR internal aud canal with and without, and CT angio head and neck, and admission to stroke unit. SC: Code stroke called with prenote. Likely bells palsy per neurology. No indication for TPA at this time. Prednisone and valtrex ordered. Neuro requesting MR brain noncon, MR internal aud canal with and without, and CT angio head and neck, and admission to tele. SR: valcyclovir ordered prior to knowing patient had an MYESHA. however valcyclovir not given as patient failed dysphagia screening.

## 2020-12-20 NOTE — ED PROVIDER NOTE - PHYSICAL EXAMINATION
CONSTITUTIONAL: in no acute distress.   SKIN: warm, dry  HEAD: Normocephalic.  EYES: PERRL.  ENT: Airway clear.  NECK: Supple.  LYMPH: No acute cervical adenopathy.  CARD: Regular rate and rhythm.   RESP: No wheezing, rales or rhonchi.  ABD: soft ntnd  EXT: No clubbing, cyanosis.   NEURO: Alert, oriented. no dysmetria, no pronator drift, speech with minor slur, CN II-XII intact w/ exception of L ptosis and R facial droop, negative romberg  PSYCH: Cooperative, appropriate.

## 2020-12-20 NOTE — ED PROVIDER NOTE - CARE PLAN
Principal Discharge DX:	Facial droop  Secondary Diagnosis:	Ptosis  Secondary Diagnosis:	Otitis media   Principal Discharge DX:	Facial droop  Secondary Diagnosis:	Ptosis  Secondary Diagnosis:	Otitis media  Secondary Diagnosis:	COVID-19   Principal Discharge DX:	Facial droop  Secondary Diagnosis:	Ptosis  Secondary Diagnosis:	Otitis media  Secondary Diagnosis:	COVID-19  Secondary Diagnosis:	MYESHA (acute kidney injury)

## 2020-12-20 NOTE — ED ADULT NURSE NOTE - CHIEF COMPLAINT QUOTE
PT c/o L eye twitching starting yesterday and today pt began to have left facial droop and slurred speech.

## 2020-12-20 NOTE — ED PROVIDER NOTE - PMH
CAD (coronary artery disease)  s/p CABG X3  Diabetes    HTN (hypertension)    Hypercholesteremia

## 2020-12-20 NOTE — CONSULT NOTE ADULT - ASSESSMENT
69 YO RHM with pmhx of dm .htn, cad cabg , chicken pox as a child p.w right sided facial droop which he noticed an hour prior to presentation. On arrival to ED a stroke code was activated , initial bp 118/70  fs 280 on field. Patient denies , headache, dizziness, rash in oral cavity or ears, problems hearing, denies focal weakness numbness or paresthesias. Patient does reports right ear pain, stuffiness and clear yellow discharge. CTH negative for acute intracranial findings. CTA H/N negative for LVO. Patient was evaluated by Neurologist on call Dr Burleson  via telestroke. Has right LMN Facial palsy and a left partial eyelid droop with twitching. Patient was not a candidate for TPA since the time of actual symptom onset was yesterday and the low suspicion for stroke.       DDX  -Right bells palsy  -Kelin hunt syndrome  -Brain stem stroke       Plan  Admit to telemetry  MRI BRAIN WW/O Contrast  MRI IAC ww/o contrast  REEG  Lipid profile, hbaic, ESR, RPR, TSH,Lyme titres, ace level, ANCA, anti AChr antibodies,(binding blocking and modulating) anti musk antibody,   Start prednisone 60 mg q daily x 7 days then quick taper  Start valtrex 500mg bid x 5 days  Artificial tears prn when awake , use lacrilube overnight and cover the right eye with an eye shield.  Call with questions 71 YO RHM with pmhx of dm .htn, cad cabg , chicken pox as a child p.w right sided facial droop which he noticed an hour prior to presentation. On arrival to ED a stroke code was activated , initial bp 118/70  fs 280 on field. Patient denies , headache, dizziness, rash in oral cavity or ears, problems hearing, denies focal weakness numbness or paresthesias. Patient does reports right ear pain, stuffiness and clear yellow discharge. CTH negative for acute intracranial findings. CTA H/N negative for LVO. Patient was evaluated by Neurologist on call Dr Burleson  via telestroke. Has right LMN Facial palsy and a left partial eyelid droop with twitching. Patient was not a candidate for TPA since the time of actual symptom onset was yesterday and the low suspicion for stroke.       DDX  -Right bells palsy  -Kelin hunt syndrome  -Brain stem stroke       Plan  Admit to telemetry  MRI BRAIN WW/O Contrast  MRI IAC ww/o contrast  REEG  Lipid profile, hbaic, ESR, RPR, TSH,Lyme titres, ace level, ANCA, anti AChr antibodies,(binding blocking and modulating) anti musk antibody,   Start prednisone 60 mg q daily x 7 days then quick taper  Start valtrex 500mg bid x 5 days  Artificial tears prn when awake , use lacrilube overnight and cover the right eye with an eye shield.  Obtain ENT evaluation  Call with questions 71 YO RHM with pmhx of dm .htn, cad cabg , chicken pox as a child p.w right sided facial droop which he noticed an hour prior to presentation. On arrival to ED a stroke code was activated , initial bp 118/70  fs 280 on field. Patient denies , headache, dizziness, rash in oral cavity or ears, problems hearing, denies focal weakness numbness or paresthesias. Patient does reports right ear pain, stuffiness and clear yellow discharge. CTH negative for acute intracranial findings. CTA H/N negative for LVO. Patient was evaluated by Neurologist on call Dr Burleson  via telestroke. Has right LMN Facial palsy and a left partial eyelid droop with twitching. Patient was not a candidate for TPA since the time of actual symptom onset was yesterday and the low suspicion for stroke.       DDX  -Right bells palsy  -Kelin hunt syndrome  -Brain stem stroke       Plan  Admit to telemetry  MRI BRAIN and IAC WW/O Contrast (if not contraindicated in this patient )  REEG  Lipid profile, hbaic, ESR, RPR, TSH,Lyme titres, ace level, ANCA, anti AChr antibodies,(binding blocking and modulating) anti musk antibody,   Start prednisone 60 mg q daily x 7 days then quick taper  Start valtrex 500mg bid x 5 days  Artificial tears prn when awake , use lacrilube overnight and cover the right eye with an eye shield.  Obtain ENT evaluation  Call with questions 71 YO RHM with pmhx of dm .htn, cad cabg , chicken pox as a child p.w right sided facial droop which he noticed an hour prior to presentation. On arrival to ED a stroke code was activated , initial bp 118/70  fs 280 on field. Patient denies , headache, dizziness, rash in oral cavity or ears, problems hearing, denies focal weakness numbness or paresthesias. Patient does reports right ear pain, stuffiness and clear yellow discharge. CTH negative for acute intracranial findings. CTA H/N negative for LVO. Patient was evaluated by Neurologist on call Dr Burleson  via telestroke. Has right LMN Facial palsy and a left partial eyelid droop with twitching. Patient was not a candidate for TPA since the time of actual symptom onset was yesterday and the low suspicion for stroke.       DDX  -Right bells palsy  -Kelin hunt syndrome  -Brain stem stroke       Plan  Admit to telemetry  MRI BRAIN and IAC WW/O Contrast (if not contraindicated in this patient )  REEG  Lipid profile, hbaic, ESR, RPR, TSH,Lyme titres, ace level, ANCA, anti AChr antibodies,(binding blocking and modulating) anti musk antibody, HSV igm  Start prednisone 60 mg q daily x 7 days then quick taper  Start valtrex 500mg bid x 5 days  Artificial tears prn when awake , use lacrilube overnight and cover the right eye with an eye shield.  Obtain ENT evaluation  Call with questions

## 2020-12-20 NOTE — ED PROVIDER NOTE - CLINICAL SUMMARY MEDICAL DECISION MAKING FREE TEXT BOX
70 year old male with a pmh of dm htn hld cabg was a pre-notification for a stroke code. History obtained from both patient and wife. Patient began having left eye droop yesterday and approximately 1 hour prior to arrival while eating dinner he began having right sided facial droop. Vs reviewed.Labs imaging ekg obtained and reviewed. Patient not a TPA candidate as initial symptoms started 1 day prior. Patient also found to have a right otitis media and antibiotics given. Patient admitted to telemetry as per neurology for further evaluation.

## 2020-12-20 NOTE — ED PROVIDER NOTE - OBJECTIVE STATEMENT
70M with pmh of CABG on ASA, HTN, HLD, DM, not on AC presents with R sided facial droop and L eye ptosis beginning 1 hour ago. PT denies numbness, other weakness, loss of balance or HA. Notes 3 days of R ear discharge, but denies cough, fever, n/v/d, abd pain, sinus congestion, LOC.

## 2020-12-20 NOTE — CONSULT NOTE ADULT - SUBJECTIVE AND OBJECTIVE BOX
CC: Right facial Droop        HPI:  69 YO RHM with pmhx of dm .htn, cad cabg p.w right sided facial droop which he noticed an hour prior to presentation. As per patient ; he started noticing a left eyelid droop since yesterday. He then was eating dinner today when his wife noticed the right facial droop and mild slurring of speech , got concerned and called 911. On arrival to ED a stroke code was activated , initial bp 118/70  fs 280 on field. Patient denies , headache, dizziness, rash in oral cavity or ears, denies focal weakness numbness or paresthesias. Patient does reports right ear pain, stuffiness and clear yellow discharge.           Home Medications:  acebutolol 200 mg oral capsule: 1 cap(s) orally 2 times a day   atorvastatin 40 mg oral tablet: 1 tab(s) orally once a day   glimepiride 1 mg oral tablet: 1 tab(s) orally once a day  losartan-hydrochlorothiazide 100mg-12.5mg oral tablet: 1 tab(s) orally once a day  metformin 500 mg oral tablet: 1 tab(s) orally 2 times a day   ASA 81 mg q 24      Social History  Denies smoking alcohol or drug use          Neuro Exam:  Orientation: oriented to person, place time and situation, speech and language intact  Cranial Nerves: Intact except for a right LMN palsy                         Partial Left eye lid ptosis and twitching  Motor: 5/5 throughout/ no drift             No abnormal involuntary mvmts.   Sensory exam:  intact and symmetric  Coordination:. no dysmetria or limb ataxia  gait: deferred        NIHSS:     Allergies    No Known Allergies    Intolerances      MEDICATIONS  (STANDING):      MEDICATIONS  (PRN):      LABS:                        13.0   7.85  )-----------( 246      ( 20 Dec 2020 20:27 )             40.5     12-20    137  |  100  |  27<H>  ----------------------------<  252<H>  4.5   |  24  |  2.0<H>    Ca    9.9      20 Dec 2020 20:27    TPro  7.6  /  Alb  4.2  /  TBili  0.4  /  DBili  x   /  AST  27  /  ALT  26  /  AlkPhos  74  12-20    PT/INR - ( 20 Dec 2020 20:27 )   PT: 12.60 sec;   INR: 1.10 ratio         PTT - ( 20 Dec 2020 20:27 )  PTT:35.5 sec      Respiratory Viral Panel with COVID-19 by ROSA MARIA (12.20.20 @ 21:07)   Rapid RVP Result: Detected   SARS-CoV-2: Detected:        Neuro Imaging:  < from: CT Brain Stroke Protocol (12.20.20 @ 20:32) >  IMPRESSION:      No CT evidence of acute intracranial pathology.    Right mastoid near complete opacification. Please correlate with possible infection or inflammation.    Mild chronic microvascular ischemic changes.            DAVINA LINDSEY M.D., RESIDENT RADIOLOGIST  This document has been electronically signed.  GRISEL AMOR MD; Attending Radiologist  This document has been electronically signed. Dec 20 2020  9:02PM    < end of copied text >      < from: CT Angio Head w/ IV Cont (12.20.20 @ 21:56) >  FINDINGS:    NECK:  The visualized aortic arch and great vessel origins are patent.    The right common, internal and external carotid arteries are patent. Atherosclerosisat the carotid bulb resulting in minimal stenosis.    The left common, internal and external carotid arteries are patent. Atherosclerosis at the carotid bulb resulting in minimal stenosis.    The vertebral arteries are patent.    HEAD:  Right:  The right distal internal carotid artery is patent, with atherosclerosis of the cavernous segment resulting in less than 50% stenosis.. The right anterior and middle cerebral arteries are patent.    Left:  The left distal internal carotid artery is patent,with atherosclerosis of the cavernous segment resulting in less than 50% stenosis. The left anterior and middle cerebral arteries are patent.    The distal vertebral arteries are left dominant and patent. Atherosclerosis of the distal vertebral arteries resulting in minimal stenosis. The basilar artery is patent. The posterior cerebral arteries are patent.    OTHER:  Again noted near complete opacification of right mastoid air cells.  0.8 cm left thyroid nodule. Left thyroid calcifications.  Biapical lungs without focal consolidation.  No enlarged cervical lymph nodes.    IMPRESSION:    No intracranial large vessel filling defect.    Cervical carotid arteries are patent.          ******PRELIMINARY REPORT******    ******PRELIMINARY REPORT******          GINO OSPINA M.D., RESIDENT RADIOLOGIST    < end of copied text >    EEG:     Echo:   Carotid Doppler: N/A  Telemetry:              CC: Right facial Droop        HPI:  69 YO RHM with pmhx of dm .htn, cad cabg p.w right sided facial droop which he noticed an hour prior to presentation. As per patient ; he started noticing a left eyelid droop since yesterday. He then was eating dinner today when his wife noticed the right facial droop and mild slurring of speech , got concerned and called 911. On arrival to ED a stroke code was activated , initial bp 118/70  fs 280 on field. Patient denies , headache, dizziness, rash in oral cavity or ears, denies focal weakness numbness or paresthesias. Patient does reports right ear pain, stuffiness and clear yellow discharge.           Home Medications:  acebutolol 200 mg oral capsule: 1 cap(s) orally 2 times a day   atorvastatin 40 mg oral tablet: 1 tab(s) orally once a day   glimepiride 1 mg oral tablet: 1 tab(s) orally once a day  losartan-hydrochlorothiazide 100mg-12.5mg oral tablet: 1 tab(s) orally once a day  metformin 500 mg oral tablet: 1 tab(s) orally 2 times a day   ASA 81 mg q 24      Social History  Denies smoking alcohol or drug use          Neuro Exam:  Orientation: oriented to person, place time and situation, speech and language intact  Cranial Nerves: Intact except for a right LMN palsy                         Partial Left eye lid ptosis and twitching  Motor: 5/5 throughout/ no drift             No abnormal involuntary mvmts.   Sensory exam:  intact and symmetric  Coordination:. no dysmetria or limb ataxia  gait: deferred        NIHSS: 2  Loc 0   commands 2  questions 0  vf: 0  Gaze 0  Face 2(R)  Motor 0  Ataxia 0  speech 0  Language 0  Extinction 0        mRs (0 at baseline , is a 1 currently)  0 No symptoms at all  1 No significant disability despite symptoms; able to carry out all usual duties and activities without assistance  2 Slight disability; unable to carry out all previous activities, but able to look after own affairs  3 Moderate disability; requiring some help, but able to walk without assistance  4 Moderately severe disability; unable to walk without assistance and unable to attend to own bodily needs without assistance  5 Severe disability; bedridden, incontinent and requiring constant nursing care and attention  6 Dead        Allergies    No Known Allergies    Intolerances      MEDICATIONS  (STANDING):      MEDICATIONS  (PRN):      LABS:                        13.0   7.85  )-----------( 246      ( 20 Dec 2020 20:27 )             40.5     12-20    137  |  100  |  27<H>  ----------------------------<  252<H>  4.5   |  24  |  2.0<H>    Ca    9.9      20 Dec 2020 20:27    TPro  7.6  /  Alb  4.2  /  TBili  0.4  /  DBili  x   /  AST  27  /  ALT  26  /  AlkPhos  74  12-20    PT/INR - ( 20 Dec 2020 20:27 )   PT: 12.60 sec;   INR: 1.10 ratio         PTT - ( 20 Dec 2020 20:27 )  PTT:35.5 sec      Respiratory Viral Panel with COVID-19 by ROSA MARIA (12.20.20 @ 21:07)   Rapid RVP Result: Detected   SARS-CoV-2: Detected:        Neuro Imaging:  < from: CT Brain Stroke Protocol (12.20.20 @ 20:32) >  IMPRESSION:      No CT evidence of acute intracranial pathology.    Right mastoid near complete opacification. Please correlate with possible infection or inflammation.    Mild chronic microvascular ischemic changes.            DAVINA LINDSEY M.D., RESIDENT RADIOLOGIST  This document has been electronically signed.  GRISEL AMOR MD; Attending Radiologist  This document has been electronically signed. Dec 20 2020  9:02PM    < end of copied text >      < from: CT Angio Head w/ IV Cont (12.20.20 @ 21:56) >  FINDINGS:    NECK:  The visualized aortic arch and great vessel origins are patent.    The right common, internal and external carotid arteries are patent. Atherosclerosisat the carotid bulb resulting in minimal stenosis.    The left common, internal and external carotid arteries are patent. Atherosclerosis at the carotid bulb resulting in minimal stenosis.    The vertebral arteries are patent.    HEAD:  Right:  The right distal internal carotid artery is patent, with atherosclerosis of the cavernous segment resulting in less than 50% stenosis.. The right anterior and middle cerebral arteries are patent.    Left:  The left distal internal carotid artery is patent,with atherosclerosis of the cavernous segment resulting in less than 50% stenosis. The left anterior and middle cerebral arteries are patent.    The distal vertebral arteries are left dominant and patent. Atherosclerosis of the distal vertebral arteries resulting in minimal stenosis. The basilar artery is patent. The posterior cerebral arteries are patent.    OTHER:  Again noted near complete opacification of right mastoid air cells.  0.8 cm left thyroid nodule. Left thyroid calcifications.  Biapical lungs without focal consolidation.  No enlarged cervical lymph nodes.    IMPRESSION:    No intracranial large vessel filling defect.    Cervical carotid arteries are patent.          ******PRELIMINARY REPORT******    ******PRELIMINARY REPORT******          GINO OSPINA M.D., RESIDENT RADIOLOGIST    < end of copied text >    EEG:     Echo:   Carotid Doppler: N/A  Telemetry:

## 2020-12-20 NOTE — ED PROVIDER NOTE - ATTENDING CONTRIBUTION TO CARE
70 year old male with a pmh of dm htn hld cabg was a pre-notification for a stroke code. History obtained from both patient and wife. Patient began having left eye droop yesterday and approximately 1 hour prior to arrival while eating dinner he began having right sided facial droop. No headache no numbness/tingling cp sob n/v abdominal pain.  On exam  CONSTITUTIONAL: WA / WN / NAD  HEAD: NCAT  EYES: PERRL; EOMI;   ENT: Normal pharynx; mucous membranes pink/moist, no erythema.  NECK: Supple; no meningeal signs  CARD: RRR; nl S1/S2; no M/R/G. Pulses equal bilaterally.  RESP: Respiratory rate and effort are normal; breath sounds clear and equal bilaterally.  ABD: Soft, NT ND   MSK/EXT: No gross deformities; full range of motion.  SKIN: Warm and dry;   NEURO: AAOx3, Motor 5/5 x 4 extremities. No drift + right facial droop + left eye ptosis. No dysmetria or dyasthria. NIH 2  PSYCH: Memory Intact, Normal Affect 70 year old male with a pmh of dm htn hld cabg was a pre-notification for a stroke code. History obtained from both patient and wife. Patient began having left eye droop yesterday and approximately 1 hour prior to arrival while eating dinner he began having right sided facial droop. No headache no numbness/tingling cp sob n/v abdominal pain.  On exam  CONSTITUTIONAL: WA / WN / NAD  HEAD: NCAT  EYES: PERRL; EOMI;   ENT: Normal pharynx; mucous membranes pink/moist, no erythema. + left TM WNL + right TM with fluid. No mastoid tenderness  NECK: Supple; no meningeal signs  CARD: RRR; nl S1/S2; no M/R/G. Pulses equal bilaterally.  RESP: Respiratory rate and effort are normal; breath sounds clear and equal bilaterally.  ABD: Soft, NT ND   MSK/EXT: No gross deformities; full range of motion.  SKIN: Warm and dry;   NEURO: AAOx3, Motor 5/5 x 4 extremities. No drift + right facial droop + left eye ptosis. No dysmetria or dyasthria. NIH 2  PSYCH: Memory Intact, Normal Affect

## 2020-12-21 LAB
ALBUMIN SERPL ELPH-MCNC: 4 G/DL — SIGNIFICANT CHANGE UP (ref 3.5–5.2)
ALP SERPL-CCNC: 71 U/L — SIGNIFICANT CHANGE UP (ref 30–115)
ALT FLD-CCNC: 24 U/L — SIGNIFICANT CHANGE UP (ref 0–41)
ANION GAP SERPL CALC-SCNC: 11 MMOL/L — SIGNIFICANT CHANGE UP (ref 7–14)
APTT BLD: 33.9 SEC — SIGNIFICANT CHANGE UP (ref 27–39.2)
AST SERPL-CCNC: 23 U/L — SIGNIFICANT CHANGE UP (ref 0–41)
BASOPHILS # BLD AUTO: 0.03 K/UL — SIGNIFICANT CHANGE UP (ref 0–0.2)
BASOPHILS NFR BLD AUTO: 0.4 % — SIGNIFICANT CHANGE UP (ref 0–1)
BILIRUB SERPL-MCNC: 0.9 MG/DL — SIGNIFICANT CHANGE UP (ref 0.2–1.2)
BUN SERPL-MCNC: 28 MG/DL — HIGH (ref 10–20)
CALCIUM SERPL-MCNC: 9.4 MG/DL — SIGNIFICANT CHANGE UP (ref 8.5–10.1)
CHLORIDE SERPL-SCNC: 102 MMOL/L — SIGNIFICANT CHANGE UP (ref 98–110)
CO2 SERPL-SCNC: 25 MMOL/L — SIGNIFICANT CHANGE UP (ref 17–32)
CREAT ?TM UR-MCNC: 85 MG/DL — SIGNIFICANT CHANGE UP
CREAT SERPL-MCNC: 1.5 MG/DL — SIGNIFICANT CHANGE UP (ref 0.7–1.5)
D DIMER BLD IA.RAPID-MCNC: 148 NG/ML DDU — SIGNIFICANT CHANGE UP (ref 0–230)
EOSINOPHIL # BLD AUTO: 0.32 K/UL — SIGNIFICANT CHANGE UP (ref 0–0.7)
EOSINOPHIL NFR BLD AUTO: 4.8 % — SIGNIFICANT CHANGE UP (ref 0–8)
ERYTHROCYTE [SEDIMENTATION RATE] IN BLOOD: 43 MM/HR — HIGH (ref 0–10)
GLUCOSE BLDC GLUCOMTR-MCNC: 171 MG/DL — HIGH (ref 70–99)
GLUCOSE BLDC GLUCOMTR-MCNC: 239 MG/DL — HIGH (ref 70–99)
GLUCOSE SERPL-MCNC: 163 MG/DL — HIGH (ref 70–99)
HCT VFR BLD CALC: 38.2 % — LOW (ref 42–52)
HCV AB S/CO SERPL IA: 0.04 COI — SIGNIFICANT CHANGE UP
HCV AB SERPL-IMP: SIGNIFICANT CHANGE UP
HGB BLD-MCNC: 12.3 G/DL — LOW (ref 14–18)
IMM GRANULOCYTES NFR BLD AUTO: 0.4 % — HIGH (ref 0.1–0.3)
INR BLD: 1.06 RATIO — SIGNIFICANT CHANGE UP (ref 0.65–1.3)
LDH SERPL L TO P-CCNC: 125 U/L — SIGNIFICANT CHANGE UP (ref 50–242)
LYMPHOCYTES # BLD AUTO: 1.04 K/UL — LOW (ref 1.2–3.4)
LYMPHOCYTES # BLD AUTO: 15.5 % — LOW (ref 20.5–51.1)
MAGNESIUM SERPL-MCNC: 2.1 MG/DL — SIGNIFICANT CHANGE UP (ref 1.8–2.4)
MCHC RBC-ENTMCNC: 27 PG — SIGNIFICANT CHANGE UP (ref 27–31)
MCHC RBC-ENTMCNC: 32.2 G/DL — SIGNIFICANT CHANGE UP (ref 32–37)
MCV RBC AUTO: 83.8 FL — SIGNIFICANT CHANGE UP (ref 80–94)
MONOCYTES # BLD AUTO: 0.65 K/UL — HIGH (ref 0.1–0.6)
MONOCYTES NFR BLD AUTO: 9.7 % — HIGH (ref 1.7–9.3)
NEUTROPHILS # BLD AUTO: 4.65 K/UL — SIGNIFICANT CHANGE UP (ref 1.4–6.5)
NEUTROPHILS NFR BLD AUTO: 69.2 % — SIGNIFICANT CHANGE UP (ref 42.2–75.2)
NRBC # BLD: 0 /100 WBCS — SIGNIFICANT CHANGE UP (ref 0–0)
OSMOLALITY UR: 511 MOS/KG — SIGNIFICANT CHANGE UP (ref 50–1200)
PLATELET # BLD AUTO: 213 K/UL — SIGNIFICANT CHANGE UP (ref 130–400)
POTASSIUM SERPL-MCNC: 4.6 MMOL/L — SIGNIFICANT CHANGE UP (ref 3.5–5)
POTASSIUM SERPL-SCNC: 4.6 MMOL/L — SIGNIFICANT CHANGE UP (ref 3.5–5)
POTASSIUM UR-SCNC: 33 MMOL/L — SIGNIFICANT CHANGE UP
PROT SERPL-MCNC: 7.3 G/DL — SIGNIFICANT CHANGE UP (ref 6–8)
PROTHROM AB SERPL-ACNC: 12.2 SEC — SIGNIFICANT CHANGE UP (ref 9.95–12.87)
RBC # BLD: 4.56 M/UL — LOW (ref 4.7–6.1)
RBC # FLD: 14.3 % — SIGNIFICANT CHANGE UP (ref 11.5–14.5)
SODIUM SERPL-SCNC: 138 MMOL/L — SIGNIFICANT CHANGE UP (ref 135–146)
SODIUM UR-SCNC: 118 MMOL/L — SIGNIFICANT CHANGE UP
UUN UR-MCNC: 601 MG/DL — SIGNIFICANT CHANGE UP
WBC # BLD: 6.72 K/UL — SIGNIFICANT CHANGE UP (ref 4.8–10.8)
WBC # FLD AUTO: 6.72 K/UL — SIGNIFICANT CHANGE UP (ref 4.8–10.8)

## 2020-12-21 PROCEDURE — 71045 X-RAY EXAM CHEST 1 VIEW: CPT | Mod: 26

## 2020-12-21 PROCEDURE — 99222 1ST HOSP IP/OBS MODERATE 55: CPT

## 2020-12-21 PROCEDURE — 99223 1ST HOSP IP/OBS HIGH 75: CPT | Mod: CS,AI

## 2020-12-21 PROCEDURE — 70551 MRI BRAIN STEM W/O DYE: CPT | Mod: 26

## 2020-12-21 RX ORDER — AMPICILLIN SODIUM AND SULBACTAM SODIUM 250; 125 MG/ML; MG/ML
INJECTION, POWDER, FOR SUSPENSION INTRAMUSCULAR; INTRAVENOUS
Refills: 0 | Status: DISCONTINUED | OUTPATIENT
Start: 2020-12-21 | End: 2020-12-21

## 2020-12-21 RX ORDER — ASPIRIN/CALCIUM CARB/MAGNESIUM 324 MG
1 TABLET ORAL
Qty: 0 | Refills: 0 | DISCHARGE

## 2020-12-21 RX ORDER — AMPICILLIN SODIUM AND SULBACTAM SODIUM 250; 125 MG/ML; MG/ML
3 INJECTION, POWDER, FOR SUSPENSION INTRAMUSCULAR; INTRAVENOUS EVERY 6 HOURS
Refills: 0 | Status: DISCONTINUED | OUTPATIENT
Start: 2020-12-21 | End: 2020-12-24

## 2020-12-21 RX ORDER — LOSARTAN/HYDROCHLOROTHIAZIDE 100MG-25MG
1 TABLET ORAL
Qty: 0 | Refills: 0 | DISCHARGE

## 2020-12-21 RX ORDER — DEXAMETHASONE 0.5 MG/5ML
10 ELIXIR ORAL ONCE
Refills: 0 | Status: COMPLETED | OUTPATIENT
Start: 2020-12-21 | End: 2020-12-21

## 2020-12-21 RX ORDER — AMPICILLIN SODIUM AND SULBACTAM SODIUM 250; 125 MG/ML; MG/ML
3 INJECTION, POWDER, FOR SUSPENSION INTRAMUSCULAR; INTRAVENOUS ONCE
Refills: 0 | Status: COMPLETED | OUTPATIENT
Start: 2020-12-21 | End: 2020-12-21

## 2020-12-21 RX ORDER — SODIUM CHLORIDE 9 MG/ML
1000 INJECTION, SOLUTION INTRAVENOUS ONCE
Refills: 0 | Status: COMPLETED | OUTPATIENT
Start: 2020-12-21 | End: 2020-12-21

## 2020-12-21 RX ORDER — ATORVASTATIN CALCIUM 80 MG/1
1 TABLET, FILM COATED ORAL
Qty: 0 | Refills: 0 | DISCHARGE

## 2020-12-21 RX ORDER — METOPROLOL TARTRATE 50 MG
1 TABLET ORAL
Qty: 0 | Refills: 0 | DISCHARGE

## 2020-12-21 RX ORDER — ROSUVASTATIN CALCIUM 5 MG/1
1 TABLET ORAL
Qty: 0 | Refills: 0 | DISCHARGE

## 2020-12-21 RX ORDER — NIFEDIPINE 30 MG
1 TABLET, EXTENDED RELEASE 24 HR ORAL
Qty: 0 | Refills: 0 | DISCHARGE

## 2020-12-21 RX ORDER — SODIUM CHLORIDE 9 MG/ML
1000 INJECTION, SOLUTION INTRAVENOUS
Refills: 0 | Status: DISCONTINUED | OUTPATIENT
Start: 2020-12-21 | End: 2020-12-22

## 2020-12-21 RX ORDER — ACEBUTOLOL HCL 200 MG
1 CAPSULE ORAL
Qty: 0 | Refills: 0 | DISCHARGE

## 2020-12-21 RX ORDER — GLIMEPIRIDE 1 MG
1 TABLET ORAL
Qty: 0 | Refills: 0 | DISCHARGE

## 2020-12-21 RX ORDER — HYDRALAZINE HCL 50 MG
25 TABLET ORAL ONCE
Refills: 0 | Status: COMPLETED | OUTPATIENT
Start: 2020-12-21 | End: 2020-12-21

## 2020-12-21 RX ORDER — HEPARIN SODIUM 5000 [USP'U]/ML
5000 INJECTION INTRAVENOUS; SUBCUTANEOUS EVERY 8 HOURS
Refills: 0 | Status: DISCONTINUED | OUTPATIENT
Start: 2020-12-21 | End: 2020-12-21

## 2020-12-21 RX ORDER — METFORMIN HYDROCHLORIDE 850 MG/1
1 TABLET ORAL
Qty: 0 | Refills: 0 | DISCHARGE

## 2020-12-21 RX ORDER — HEPARIN SODIUM 5000 [USP'U]/ML
5000 INJECTION INTRAVENOUS; SUBCUTANEOUS EVERY 8 HOURS
Refills: 0 | Status: DISCONTINUED | OUTPATIENT
Start: 2020-12-21 | End: 2020-12-22

## 2020-12-21 RX ORDER — AMPICILLIN SODIUM AND SULBACTAM SODIUM 250; 125 MG/ML; MG/ML
1.5 INJECTION, POWDER, FOR SUSPENSION INTRAMUSCULAR; INTRAVENOUS ONCE
Refills: 0 | Status: DISCONTINUED | OUTPATIENT
Start: 2020-12-21 | End: 2020-12-21

## 2020-12-21 RX ORDER — VALACYCLOVIR 500 MG/1
500 TABLET, FILM COATED ORAL DAILY
Refills: 0 | Status: DISCONTINUED | OUTPATIENT
Start: 2020-12-21 | End: 2020-12-22

## 2020-12-21 RX ADMIN — Medication 102 MILLIGRAM(S): at 18:59

## 2020-12-21 RX ADMIN — Medication 1 APPLICATION(S): at 21:50

## 2020-12-21 RX ADMIN — Medication 1 DROP(S): at 13:27

## 2020-12-21 RX ADMIN — SODIUM CHLORIDE 1000 MILLILITER(S): 9 INJECTION, SOLUTION INTRAVENOUS at 01:23

## 2020-12-21 RX ADMIN — HEPARIN SODIUM 5000 UNIT(S): 5000 INJECTION INTRAVENOUS; SUBCUTANEOUS at 21:49

## 2020-12-21 RX ADMIN — Medication 25 MILLIGRAM(S): at 18:27

## 2020-12-21 RX ADMIN — SODIUM CHLORIDE 75 MILLILITER(S): 9 INJECTION, SOLUTION INTRAVENOUS at 07:03

## 2020-12-21 RX ADMIN — AMPICILLIN SODIUM AND SULBACTAM SODIUM 200 GRAM(S): 250; 125 INJECTION, POWDER, FOR SUSPENSION INTRAMUSCULAR; INTRAVENOUS at 18:27

## 2020-12-21 RX ADMIN — HEPARIN SODIUM 5000 UNIT(S): 5000 INJECTION INTRAVENOUS; SUBCUTANEOUS at 15:34

## 2020-12-21 NOTE — H&P ADULT - NSHPSOCIALHISTORY_GEN_ALL_CORE
Marital Status:  ( X  )    (   ) Single    (   )    (  )   Lives with: (  ) alone  (  ) children   (  ) spouse   (  ) parents  (  ) other  Recent Travel: No recent travel      Substance Use (street drugs): ( x ) never used  (  ) other:  Tobacco Usage:  ( x  ) never smoked   (   ) former smoker   (   ) current smoker  (     ) pack year  Alcohol Usage: None   Baseline mobility: independant

## 2020-12-21 NOTE — H&P ADULT - ASSESSMENT
69 yo M with pmhx of CAD s/p CABG, DM, HTN, epistaxis presented to the ED with Right sided facial droop and Left eye Ptosis this evening.     Right sided Facial Paralysis, Left eye Ptosis likely 2/2 [place] Palsy   r/o Ramsy Hunt Syndrome  r/o Brainstem stroke, low suspicion  - patient Hemodynamically stable, NIHSS 2 on admission, s/p stroke code  - CTH on admission negative for stroke, CTA negative for large vessel occlusion  - CTH: right mastoid opacification, suspected infection  - f/u MRI BRAIN WW/O Contrast, MRI IAC ww/o contrast  - Neurology following: recommends prednisone 60 mg q daily x 7 days then quick taper  - Will start Valtrex (renal dosing with fluids for now given MYESHA)  -  f/u Lipid profile, hbaic, ESR, RPR, TSH,Lyme titres, ace level, ANCA, anti AChr antibodies,(binding blocking and modulating) anti musk antibody,   - Artificial tears prn when awake, use lacrilube overnight and cover the right eye with an eye shield.  - ENT/ ID consult appreciated    MYESHA on CKD 3  - prerenal vs ATN  - Baseline Cr around 1.4  - Cr 2.0 on admission  - c/w hydration  - f/u urine lites    CAD s/p CABG  HTN  - Aspirin 81 mg daily  - Losartan/HCTZ 100/12.5 (hold for now in light of MYESHA)  - Metoprolol tartrate 50 mg BID  - Nifedipine 30 mg ER daily  - Allow for permissive HTN given r/o stroke    Type II DM  - takes Metformin 750 mg daily  - Glimperide 2 mg BID  - FS AC/HS  - start Basal bolus if FS >180    DVT ppx: heparin subq  Diet: DASH/CC  Dispo: Acute, from home  Activity: PATRICK 69 yo M with pmhx of CAD s/p CABG, DM, HTN, epistaxis presented to the ED with Right sided facial droop and Left eye Ptosis this evening.     Right sided Facial Paralysis, Left eye Ptosis likely 2/2 [place] Palsy   r/o Ramsy Hunt Syndrome  r/o Brainstem stroke, low suspicion  - patient Hemodynamically stable, NIHSS 2 on admission, s/p stroke code  - CTH on admission negative for stroke, CTA negative for large vessel occlusion  - CTH: right mastoid opacification, suspected infection  - f/u MRI BRAIN WW/O Contrast, MRI IAC ww/o contrast  - Neurology following: recommends prednisone 60 mg q daily x 7 days then quick taper  - Will start Valtrex (renal dosing with fluids for now given MYESHA)  -  f/u Lipid profile, hbaic, ESR, RPR, TSH,Lyme titres, ace level, ANCA, anti AChr antibodies,(binding blocking and modulating) anti musk antibody,   - Artificial tears prn when awake, use lacrilube overnight and cover the right eye with an eye shield.  - ENT/ ID consult appreciated    COVID- 19 PNA  - patient currently hemodynamically stable, SPO2 98% on room air  - f/u CXR  - obtain inflammatory markers  - Maintain SPO2 >92%  - supportive care for now    MYESHA on CKD 3  - prerenal vs ATN  - Baseline Cr around 1.4  - Cr 2.0 on admission  - c/w hydration  - f/u urine lites    CAD s/p CABG  HTN  - Aspirin 81 mg daily  - Losartan/HCTZ 100/12.5 (hold for now in light of MYESHA)  - Metoprolol tartrate 50 mg BID  - Nifedipine 30 mg ER daily  - Allow for permissive HTN given r/o stroke    Type II DM  - takes Metformin 750 mg daily  - Glimperide 2 mg BID  - FS AC/HS  - start Basal bolus if FS >180    DVT ppx: heparin subq  Diet: DASH/CC  Dispo: Acute, from home  Activity: PATRICK 71 yo M with pmhx of CAD s/p CABG, DM, HTN, epistaxis presented to the ED with Right sided facial droop and Left eye Ptosis this evening.     Right sided Facial Paralysis, Left eye Ptosis likely 2/2 [place] Palsy   r/o Ramsy Hunt Syndrome  r/o Brainstem stroke, low suspicion  - patient Hemodynamically stable, NIHSS 2 on admission, s/p stroke code  - CTH on admission negative for stroke, CTA negative for large vessel occlusion  - CTH: right mastoid opacification, suspected infection  - f/u MRI BRAIN WW/O Contrast, MRI IAC ww/o contrast  - Neurology following: recommends prednisone 60 mg q daily x 7 days then quick taper  - Will start Valtrex (renal dosing with fluids for now given MYESHA)  -  f/u Lipid profile, hbaic, ESR, RPR, TSH,Lyme titres, ace level, ANCA, anti AChr antibodies,(binding blocking and modulating) anti musk antibody,   - SLP eval  - Artificial tears prn when awake, use lacrilube overnight and cover the right eye with an eye shield.  - ENT/ ID consult appreciated    COVID- 19 PNA  - patient currently hemodynamically stable, SPO2 98% on room air  - f/u CXR  - obtain inflammatory markers  - Maintain SPO2 >92%  - supportive care for now    MYESHA on CKD 3  - prerenal vs ATN  - Baseline Cr around 1.4  - Cr 2.0 on admission  - c/w hydration  - f/u urine lites    CAD s/p CABG  HTN  - Aspirin 81 mg daily  - Losartan/HCTZ 100/12.5 (hold for now in light of MYESHA)  - Metoprolol tartrate 50 mg BID  - Nifedipine 30 mg ER daily  - Allow for permissive HTN given r/o stroke    Type II DM  - takes Metformin 750 mg daily  - Glimperide 2 mg BID  - FS AC/HS  - start Basal bolus if FS >180    DVT ppx: heparin subq  Diet: Npo for now  Dispo: Acute, from home  Activity: PATRICK

## 2020-12-21 NOTE — PATIENT PROFILE ADULT - NSASFALLNEEDSASSIST_GEN_A_NUR
Pt 2/25 for bronchitis by Dr. René Connor states she was supposed to return back to work today, but is still feeling very bad. Tried to go to work and they gave her a nebulizer treatment and sent there home. Still has 2 days left of antibiotic, but not feeling any better. Please advise [phone]. yes

## 2020-12-21 NOTE — CONSULT NOTE ADULT - ASSESSMENT
Pt is a 71 yo M p/w R sided facial paralysis; CT with finding of R mastoid opacification.    ·	Start Unasyn   ·	Recommend increasing steroids, can d/c prednisone 60 mg and start decadron 10mg x 1 dose and continue with 6mg q 8hrs  ·	Recommend Ciprodex gtt to R ear   ·	Cont with artificial tears and eye patch    ·	CT temporal bone w/ contrast   ·	f/u MRI  ·	f/u L ear culture   ·	Cont valacyclovir   ·	CT Brain images reviewed with Dr. Harvey    Pt is a 69 yo M p/w R sided facial palsy; CT with finding of R mastoid opacification.    ·	Start Unasyn   ·	Recommend increasing steroids, can d/c prednisone 60 mg and start decadron 10mg x 1 dose and continue with 6mg q 8hrs  ·	Recommend Ciprodex gtt to R ear   ·	Cont with artificial tears and eye patch    ·	CT temporal bone w/ contrast   ·	f/u MRI  ·	f/u L ear culture   ·	Cont valacyclovir   ·	CT Brain images reviewed with Dr. Harvey

## 2020-12-21 NOTE — CHART NOTE - NSCHARTNOTEFT_GEN_A_CORE
Patient seen and examined today.     Plan for MRI, will give ativan as patient endorses anxiety with MRI

## 2020-12-21 NOTE — H&P ADULT - NSHPLABSRESULTS_GEN_ALL_CORE
13.0   7.85  )-----------( 246      ( 20 Dec 2020 20:27 )             40.5   12-20    137  |  100  |  27<H>  ----------------------------<  252<H>  4.5   |  24  |  2.0<H>    Ca    9.9      20 Dec 2020 20:27    TPro  7.6  /  Alb  4.2  /  TBili  0.4  /  DBili  x   /  AST  27  /  ALT  26  /  AlkPhos  74  12-20

## 2020-12-21 NOTE — H&P ADULT - NSHPPHYSICALEXAM_GEN_ALL_CORE
General: well developed, well nourished, NAD  Neuro: alert and oriented, no focal deficits, moves all extremities spontaneously  HEENT: NCAT, EOMI, anicteric, mucosa moist  Respiratory: airway patent, respirations unlabored  CVS: regular rate and rhythm  Abdomen: soft, nontender, nondistended  Extremities: no edema, sensation and movement grossly intact  Neuro: aaOX3, right sided facial LMN paralysis, left eye ptosis and twitching, MS 5/5 UE/LE, sensation intact   Skin: warm, dry, appropriate color

## 2020-12-21 NOTE — H&P ADULT - HISTORY OF PRESENT ILLNESS
71 yo M with pmhx of CAD s/p CABG, DM, HTN, epistaxis presented to the ED with Right sided facial droop and Left eye Ptosis this evening. Patient states that he was eating his dinner. He was recently diagnosed with otitis media and given oral antibiotics.  71 yo M with pmhx of CAD s/p CABG, DM, HTN, epistaxis presented to the ED with Right sided facial droop and Left eye Ptosis this evening. Patient states that he noticed a left eyelid droop since yesterday.  He ha facial droop and mild slurring of speech , got concerned and called 911. On arrival to ED a stroke code was activated , initial bp 118/70  fs 280 on field. Patient denies , headache, dizziness, rash in oral cavity or ears, denies focal weakness numbness or paresthesias. Patient does reports right ear pain, stuffiness and clear yellow discharge.   He denies any preceding events or previously similar episodes. Patient also states that his right hear has been hurting for the past week with increased discharge, and tenderness with palpation. He otherwise currently admits to some blurriness in his left eye (wears glasses at [place]), and light sensitivity. Upon arrival to the ED, Code stroke was activated, with NIHSS score of 2. Patient currently denies any chest pain, other focal deficits, abdominal discomfort, headaches, shortness of breath, and remains with right sided facial paralysis.     ED course: Vitals stable on admission. Stroke code activated, patient out of TPA window.  69 yo M with pmhx of CAD s/p CABG, DM, HTN, epistaxis presented to the ED with Right sided facial droop and Left eye Ptosis this evening. Patient states that he noticed a left eyelid droop since yesterday. His wife noticed left facial droop and mild slurring of speech, while eating dinner yesterday, and activated 911. He denies any preceding events or previously similar episodes. Patient also states that his right hear has been hurting for the past week with increased discharge, and tenderness with palpation. He otherwise currently admits to some blurriness in his left eye (wears glasses at basel), and light sensitivity. Upon arrival to the ED, Code stroke was activated, with NIHSS score of 2. Patient currently denies any chest pain, other focal deficits, abdominal discomfort, headaches, shortness of breath, and remains with right sided facial paralysis.     ED course: Vitals stable on admission. Stroke code activated, patient out of TPA window. c 71 yo M with pmhx of CAD s/p CABG, DM, HTN, epistaxis presented to the ED with Right sided facial droop and Left eye Ptosis this evening. Patient states that he noticed a left eyelid droop since yesterday. His wife noticed left facial droop and mild slurring of speech, while eating dinner yesterday, and activated 911. He denies any preceding events or previously similar episodes. Patient also states that his right hear has been hurting for the past week with increased discharge, and tenderness with palpation. He otherwise currently admits to some blurriness in his left eye (wears glasses at [place]), and light sensitivity.  Of nl to the ED, Code stroke was activated, with NIHSS score of 2. Patient currently denies any chest pain, other focal deficits, abdominal discomfort, headaches, shortness of breath, and remains with right sided facial paralysis.     ED course: Vitals stable on admission. Stroke code activated, patient out of TPA window.  71 yo M with pmhx of CAD s/p CABG, DM, HTN, epistaxis presented to the ED with Right sided facial droop and Left eye Ptosis this evening. Patient states that he noticed a left eyelid droop since yesterday. His wife noticed left facial droop and mild slurring of speech, while eating dinner yesterday, and activated 911. He denies any preceding events or previously similar episodes. Patient also states that his right hear has been hurting for the past week with increased discharge, and tenderness with palpation. He otherwise currently admits to some blurriness in his left eye (wears glasses at baseline and light sensitivity.  Of note, patient admits to having chicken pox when younger.  to the ED, Code stroke was activated, with NIHSS score of 2. Patient currently denies any chest pain, other focal deficits, abdominal discomfort, headaches, shortness of breath, and remains with right sided facial paralysis.     ED course: Vitals stable on admission. Stroke code activated, patient out of TPA window. CTH negative for stroke, CTA with no large vessel occlusion, but has Right mastoid complete opacification, with suspected inflammation. COVID 19 + in ED, CXR pending. No leukocytosis, with elevated creatinine

## 2020-12-21 NOTE — CONSULT NOTE ADULT - ASSESSMENT
ASSESSMENT  71 yo M with pmhx of CAD s/p CABG, DM, HTN, epistaxis presented to the ED with Right sided facial droop and Left eye Ptosis this evening.    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  Spectra [phone]

## 2020-12-21 NOTE — CONSULT NOTE ADULT - SUBJECTIVE AND OBJECTIVE BOX
Pt is a 70y Male who presents with     PAST MEDICAL & SURGICAL HISTORY:  CAD (coronary artery disease)  s/p CABG X3  Hypercholesteremia  Diabetes  HTN (hypertension)  S/P CABG (coronary artery bypass graft)    MEDICATIONS  (STANDING):  artificial  tears Solution 1 Drop(s) Both EYES daily  heparin   Injectable 5000 Unit(s) SubCutaneous every 8 hours  lactated ringers. 1000 milliLiter(s) (75 mL/Hr) IV Continuous <Continuous>  petrolatum Ophthalmic Ointment 1 Application(s) Both EYES at bedtime  predniSONE   Tablet 60 milliGRAM(s) Oral daily  valACYclovir Oral Liquid - Peds 500 milliGRAM(s) Oral daily    MEDICATIONS  (PRN):  LORazepam     Tablet 2 milliGRAM(s) Oral once PRN Anxiety      Allergies  No Known Allergies    SOCIAL HISTORY:    FAMILY HISTORY:  Family history of brain aneurysm (Mother)      Review of Systems:  [X] A ten point review of systems was otherwise negative except as noted.  [ ] Due to altered mental status/ intubation, subjective information was not able to be obtained from the patient. History was obtained, to the extent possible, from review of the chart and collateral sources of information     Vital Signs Last 24 Hrs  T(C): 36.6 (21 Dec 2020 12:33), Max: 36.9 (20 Dec 2020 20:33)  T(F): 97.9 (21 Dec 2020 12:33), Max: 98.4 (20 Dec 2020 20:33)  HR: 79 (21 Dec 2020 12:33) (72 - 92)  BP: 183/87 (21 Dec 2020 12:33) (104/60 - 183/87)  RR: 18 (21 Dec 2020 12:33) (18 - 18)  SpO2: 97% (21 Dec 2020 12:33) (95% - 97%)    GEN: NAD  SKIN: Good color, non diaphoretic  HEENT:   RESP:Non-labored breathing  CARDIO: +S1/S2  ABDO: Soft, NT  EXT: HAZEL x 4      LABS:                        12.3   6.72  )-----------( 213      ( 21 Dec 2020 05:35 )             38.2     12-21    138  |  102  |  28<H>  ----------------------------<  163<H>  4.6   |  25  |  1.5    Ca    9.4      21 Dec 2020 05:35  Mg     2.1     12-21    TPro  7.3  /  Alb  4.0  /  TBili  0.9  /  DBili  x   /  AST  23  /  ALT  24  /  AlkPhos  71  12-21    PT/INR - ( 21 Dec 2020 05:35 )   PT: 12.20 sec;   INR: 1.06 ratio         PTT - ( 21 Dec 2020 05:35 )  PTT:33.9 sec      RADIOLOGY & ADDITIONAL STUDIES: Pt is a 70y Male who presents with     PAST MEDICAL & SURGICAL HISTORY:  CAD (coronary artery disease)  s/p CABG X3  Hypercholesteremia  Diabetes  HTN (hypertension)  S/P CABG (coronary artery bypass graft)    MEDICATIONS  (STANDING):  artificial  tears Solution 1 Drop(s) Both EYES daily  heparin   Injectable 5000 Unit(s) SubCutaneous every 8 hours  lactated ringers. 1000 milliLiter(s) (75 mL/Hr) IV Continuous <Continuous>  petrolatum Ophthalmic Ointment 1 Application(s) Both EYES at bedtime  predniSONE   Tablet 60 milliGRAM(s) Oral daily  valACYclovir Oral Liquid - Peds 500 milliGRAM(s) Oral daily    MEDICATIONS  (PRN):  LORazepam     Tablet 2 milliGRAM(s) Oral once PRN Anxiety      Allergies  No Known Allergies    SOCIAL HISTORY:    FAMILY HISTORY:  Family history of brain aneurysm (Mother)      Review of Systems:  [X] A ten point review of systems was otherwise negative except as noted.  [ ] Due to altered mental status/ intubation, subjective information was not able to be obtained from the patient. History was obtained, to the extent possible, from review of the chart and collateral sources of information     Vital Signs Last 24 Hrs  T(C): 36.6 (21 Dec 2020 12:33), Max: 36.9 (20 Dec 2020 20:33)  T(F): 97.9 (21 Dec 2020 12:33), Max: 98.4 (20 Dec 2020 20:33)  HR: 79 (21 Dec 2020 12:33) (72 - 92)  BP: 183/87 (21 Dec 2020 12:33) (104/60 - 183/87)  RR: 18 (21 Dec 2020 12:33) (18 - 18)  SpO2: 97% (21 Dec 2020 12:33) (95% - 97%)    GEN: NAD  SKIN: Good color, non diaphoretic  HEENT: +R EAC with purulent drainage, unable to visualize TM, mild ttp to R mastoid, no preauricular ttp, +R sided facial paralysis noted; L EAC wihtout drainage and L TM intact   RESP: Non-labored breathing  CARDIO: +S1/S2  ABDO: Soft, NT  EXT: HAZEL x 4      LABS:                        12.3   6.72  )-----------( 213      ( 21 Dec 2020 05:35 )             38.2     12-21    138  |  102  |  28<H>  ----------------------------<  163<H>  4.6   |  25  |  1.5    Ca    9.4      21 Dec 2020 05:35  Mg     2.1     12-21    TPro  7.3  /  Alb  4.0  /  TBili  0.9  /  DBili  x   /  AST  23  /  ALT  24  /  AlkPhos  71  12-21    PT/INR - ( 21 Dec 2020 05:35 )   PT: 12.20 sec;   INR: 1.06 ratio         PTT - ( 21 Dec 2020 05:35 )  PTT:33.9 sec      RADIOLOGY & ADDITIONAL STUDIES:  < from: CT Brain Stroke Protocol (12.20.20 @ 20:32) >  EXAM:  CT BRAIN STROKE PROTOCOL          PROCEDURE DATE:  12/20/2020      INTERPRETATION:  CLINICAL HISTORY / REASON FOR EXAM: Right sided facial droop. Left sided ptosis.    TECHNIQUE: Multiple axial CT images of the head were obtained with sagittal and coronal reformats from the base of the skull to the vertex without the administration of IV contrast.    COMPARISON: None.      FINDINGS:    The ventricles and cerebral sulci are prominent, suggestive of age-related parenchymal volume loss.    There is no evidence of acute intracranial hemorrhage, mass effect or midline shift.    Gray-white differentiation is maintained. There are few patchy periventricular and subcortical white matter hypodensities that are nonspecific but typically attributed to chronic small vessel ischemic change.    There is no fracture to the calvarium. Predominantly vertebral atherosclerosis is noted.    Near complete opacification of the right mastoid air cells. Left mastoid air cells are unremarkable. The visualized portions of the sinuses are unremarkable.      IMPRESSION:    No CT evidence of acute intracranial pathology.    Right mastoid near complete opacification. Please correlate with possible infection or inflammation.    Mild chronic microvascular ischemic changes.    DAVINA LINDSEY M.D., RESIDENT RADIOLOGIST  This document has been electronically signed.  GRISEL AMOR MD; Attending Radiologist  This document has been electronically signed. Dec 20 2020  9:02PM    < end of copied text >   ENT CONSULT:    Pt is a 71 yo M a/w R sided facial paralysis x 1 day. ENT consulted for CT finding of R mastoid opacification. Pt seen and examined at bedside earlier today. Pt presented to the ED yesterday as a stroke code for R sided facial paralysis. CTH negative for stroke, however showed R mastoid opacification. Pt states about 5 days prior to presentation, he was having R sided ear pain with discharge; denies taking antibiotics or eardrops. Pt denies fevers, chills, SOB.     PAST MEDICAL & SURGICAL HISTORY:  CAD (coronary artery disease)  s/p CABG X3  Hypercholesteremia  Diabetes  HTN (hypertension)  S/P CABG (coronary artery bypass graft)    MEDICATIONS  (STANDING):  artificial  tears Solution 1 Drop(s) Both EYES daily  heparin   Injectable 5000 Unit(s) SubCutaneous every 8 hours  lactated ringers. 1000 milliLiter(s) (75 mL/Hr) IV Continuous <Continuous>  petrolatum Ophthalmic Ointment 1 Application(s) Both EYES at bedtime  predniSONE   Tablet 60 milliGRAM(s) Oral daily  valACYclovir Oral Liquid - Peds 500 milliGRAM(s) Oral daily    MEDICATIONS  (PRN):  LORazepam     Tablet 2 milliGRAM(s) Oral once PRN Anxiety      Allergies  No Known Allergies    SOCIAL HISTORY:    FAMILY HISTORY:  Family history of brain aneurysm (Mother)      Review of Systems:  [X] A ten point review of systems was otherwise negative except as noted.  [ ] Due to altered mental status/ intubation, subjective information was not able to be obtained from the patient. History was obtained, to the extent possible, from review of the chart and collateral sources of information     Vital Signs Last 24 Hrs  T(C): 36.6 (21 Dec 2020 12:33), Max: 36.9 (20 Dec 2020 20:33)  T(F): 97.9 (21 Dec 2020 12:33), Max: 98.4 (20 Dec 2020 20:33)  HR: 79 (21 Dec 2020 12:33) (72 - 92)  BP: 183/87 (21 Dec 2020 12:33) (104/60 - 183/87)  RR: 18 (21 Dec 2020 12:33) (18 - 18)  SpO2: 97% (21 Dec 2020 12:33) (95% - 97%)    GEN: NAD  SKIN: Good color, non diaphoretic  HEENT: +R EAC with purulent drainage, unable to visualize TM, mild ttp to R mastoid, no preauricular ttp, +R sided facial paralysis noted; L EAC wihtout drainage and L TM intact   RESP: Non-labored breathing  CARDIO: +S1/S2  ABDO: Soft, NT  EXT: HAZEL x 4      LABS:                        12.3   6.72  )-----------( 213      ( 21 Dec 2020 05:35 )             38.2     12-21    138  |  102  |  28<H>  ----------------------------<  163<H>  4.6   |  25  |  1.5    Ca    9.4      21 Dec 2020 05:35  Mg     2.1     12-21    TPro  7.3  /  Alb  4.0  /  TBili  0.9  /  DBili  x   /  AST  23  /  ALT  24  /  AlkPhos  71  12-21    PT/INR - ( 21 Dec 2020 05:35 )   PT: 12.20 sec;   INR: 1.06 ratio         PTT - ( 21 Dec 2020 05:35 )  PTT:33.9 sec      RADIOLOGY & ADDITIONAL STUDIES:  < from: CT Brain Stroke Protocol (12.20.20 @ 20:32) >  EXAM:  CT BRAIN STROKE PROTOCOL          PROCEDURE DATE:  12/20/2020      INTERPRETATION:  CLINICAL HISTORY / REASON FOR EXAM: Right sided facial droop. Left sided ptosis.    TECHNIQUE: Multiple axial CT images of the head were obtained with sagittal and coronal reformats from the base of the skull to the vertex without the administration of IV contrast.    COMPARISON: None.      FINDINGS:    The ventricles and cerebral sulci are prominent, suggestive of age-related parenchymal volume loss.    There is no evidence of acute intracranial hemorrhage, mass effect or midline shift.    Gray-white differentiation is maintained. There are few patchy periventricular and subcortical white matter hypodensities that are nonspecific but typically attributed to chronic small vessel ischemic change.    There is no fracture to the calvarium. Predominantly vertebral atherosclerosis is noted.    Near complete opacification of the right mastoid air cells. Left mastoid air cells are unremarkable. The visualized portions of the sinuses are unremarkable.      IMPRESSION:    No CT evidence of acute intracranial pathology.    Right mastoid near complete opacification. Please correlate with possible infection or inflammation.    Mild chronic microvascular ischemic changes.    DAVINA LINDSEY M.D., RESIDENT RADIOLOGIST  This document has been electronically signed.  GRISEL AMOR MD; Attending Radiologist  This document has been electronically signed. Dec 20 2020  9:02PM    < end of copied text >   ENT CONSULT:    Pt is a 69 yo M a/w R sided facial paralysis x 1 day. ENT consulted for CT finding of R mastoid opacification. Pt seen and examined at bedside earlier today. Pt presented to the ED yesterday as a stroke code for R sided facial paralysis. CTH negative for stroke, however showed R mastoid opacification. Pt states about 5 days prior to presentation, he was having R sided ear pain with discharge; denies taking antibiotics or eardrops. Pt denies fevers, chills, SOB.     PAST MEDICAL & SURGICAL HISTORY:  CAD (coronary artery disease)  s/p CABG X3  Hypercholesteremia  Diabetes  HTN (hypertension)  S/P CABG (coronary artery bypass graft)    MEDICATIONS  (STANDING):  artificial  tears Solution 1 Drop(s) Both EYES daily  heparin   Injectable 5000 Unit(s) SubCutaneous every 8 hours  lactated ringers. 1000 milliLiter(s) (75 mL/Hr) IV Continuous <Continuous>  petrolatum Ophthalmic Ointment 1 Application(s) Both EYES at bedtime  predniSONE   Tablet 60 milliGRAM(s) Oral daily  valACYclovir Oral Liquid - Peds 500 milliGRAM(s) Oral daily    MEDICATIONS  (PRN):  LORazepam     Tablet 2 milliGRAM(s) Oral once PRN Anxiety    Allergies  No Known Allergies    SOCIAL HISTORY:    FAMILY HISTORY:  Family history of brain aneurysm (Mother)      Review of Systems:  [X] A ten point review of systems was otherwise negative except as noted.  [ ] Due to altered mental status/ intubation, subjective information was not able to be obtained from the patient. History was obtained, to the extent possible, from review of the chart and collateral sources of information     Vital Signs Last 24 Hrs  T(C): 36.6 (21 Dec 2020 12:33), Max: 36.9 (20 Dec 2020 20:33)  T(F): 97.9 (21 Dec 2020 12:33), Max: 98.4 (20 Dec 2020 20:33)  HR: 79 (21 Dec 2020 12:33) (72 - 92)  BP: 183/87 (21 Dec 2020 12:33) (104/60 - 183/87)  RR: 18 (21 Dec 2020 12:33) (18 - 18)  SpO2: 97% (21 Dec 2020 12:33) (95% - 97%)    GEN: NAD  SKIN: Good color, non diaphoretic  HEENT: +R EAC with purulent drainage, unable to visualize TM, no vesicles noted to R EAC, mild ttp to R mastoid, no preauricular ttp, +R sided facial paralysis noted; L EAC wihtout drainage and L TM intact   RESP: Non-labored breathing  CARDIO: +S1/S2  ABDO: Soft, NT  EXT: HAZEL x 4      LABS:                        12.3   6.72  )-----------( 213      ( 21 Dec 2020 05:35 )             38.2     12-21    138  |  102  |  28<H>  ----------------------------<  163<H>  4.6   |  25  |  1.5    Ca    9.4      21 Dec 2020 05:35  Mg     2.1     12-21    TPro  7.3  /  Alb  4.0  /  TBili  0.9  /  DBili  x   /  AST  23  /  ALT  24  /  AlkPhos  71  12-21    PT/INR - ( 21 Dec 2020 05:35 )   PT: 12.20 sec;   INR: 1.06 ratio         PTT - ( 21 Dec 2020 05:35 )  PTT:33.9 sec      RADIOLOGY & ADDITIONAL STUDIES:  < from: CT Brain Stroke Protocol (12.20.20 @ 20:32) >  EXAM:  CT BRAIN STROKE PROTOCOL          PROCEDURE DATE:  12/20/2020      INTERPRETATION:  CLINICAL HISTORY / REASON FOR EXAM: Right sided facial droop. Left sided ptosis.    TECHNIQUE: Multiple axial CT images of the head were obtained with sagittal and coronal reformats from the base of the skull to the vertex without the administration of IV contrast.    COMPARISON: None.      FINDINGS:    The ventricles and cerebral sulci are prominent, suggestive of age-related parenchymal volume loss.    There is no evidence of acute intracranial hemorrhage, mass effect or midline shift.    Gray-white differentiation is maintained. There are few patchy periventricular and subcortical white matter hypodensities that are nonspecific but typically attributed to chronic small vessel ischemic change.    There is no fracture to the calvarium. Predominantly vertebral atherosclerosis is noted.    Near complete opacification of the right mastoid air cells. Left mastoid air cells are unremarkable. The visualized portions of the sinuses are unremarkable.      IMPRESSION:    No CT evidence of acute intracranial pathology.    Right mastoid near complete opacification. Please correlate with possible infection or inflammation.    Mild chronic microvascular ischemic changes.    DAVINA CÉSAR M.D., RESIDENT RADIOLOGIST  This document has been electronically signed.  GRISEL AMOR MD; Attending Radiologist  This document has been electronically signed. Dec 20 2020  9:02PM    < end of copied text >   ENT CONSULT:    Pt is a 71 yo M a/w R sided facial palsy x 1 day. ENT consulted for CT finding of R mastoid opacification. Pt seen and examined at bedside earlier today. Pt presented to the ED yesterday as a stroke code for R sided facial paralysis. CTH negative for stroke, however showed R mastoid opacification. Pt states about 5 days prior to presentation, he was having R sided ear pain with discharge; denies taking antibiotics or eardrops. Pt denies fevers, chills, SOB.     PAST MEDICAL & SURGICAL HISTORY:  CAD (coronary artery disease)  s/p CABG X3  Hypercholesteremia  Diabetes  HTN (hypertension)  S/P CABG (coronary artery bypass graft)    MEDICATIONS  (STANDING):  artificial  tears Solution 1 Drop(s) Both EYES daily  heparin   Injectable 5000 Unit(s) SubCutaneous every 8 hours  lactated ringers. 1000 milliLiter(s) (75 mL/Hr) IV Continuous <Continuous>  petrolatum Ophthalmic Ointment 1 Application(s) Both EYES at bedtime  predniSONE   Tablet 60 milliGRAM(s) Oral daily  valACYclovir Oral Liquid - Peds 500 milliGRAM(s) Oral daily    MEDICATIONS  (PRN):  LORazepam     Tablet 2 milliGRAM(s) Oral once PRN Anxiety    Allergies  No Known Allergies    SOCIAL HISTORY:    FAMILY HISTORY:  Family history of brain aneurysm (Mother)      Review of Systems:  [X] A ten point review of systems was otherwise negative except as noted.  [ ] Due to altered mental status/ intubation, subjective information was not able to be obtained from the patient. History was obtained, to the extent possible, from review of the chart and collateral sources of information     Vital Signs Last 24 Hrs  T(C): 36.6 (21 Dec 2020 12:33), Max: 36.9 (20 Dec 2020 20:33)  T(F): 97.9 (21 Dec 2020 12:33), Max: 98.4 (20 Dec 2020 20:33)  HR: 79 (21 Dec 2020 12:33) (72 - 92)  BP: 183/87 (21 Dec 2020 12:33) (104/60 - 183/87)  RR: 18 (21 Dec 2020 12:33) (18 - 18)  SpO2: 97% (21 Dec 2020 12:33) (95% - 97%)    GEN: NAD  SKIN: Good color, non diaphoretic  HEENT: +R EAC with purulent drainage, unable to visualize TM, no vesicles noted to R EAC, mild ttp to R mastoid, no preauricular ttp, +R sided facial palsy noted; L EAC wihtout drainage and L TM intact   RESP: Non-labored breathing  CARDIO: +S1/S2  ABDO: Soft, NT  EXT: HAZEL x 4      LABS:                        12.3   6.72  )-----------( 213      ( 21 Dec 2020 05:35 )             38.2     12-21    138  |  102  |  28<H>  ----------------------------<  163<H>  4.6   |  25  |  1.5    Ca    9.4      21 Dec 2020 05:35  Mg     2.1     12-21    TPro  7.3  /  Alb  4.0  /  TBili  0.9  /  DBili  x   /  AST  23  /  ALT  24  /  AlkPhos  71  12-21    PT/INR - ( 21 Dec 2020 05:35 )   PT: 12.20 sec;   INR: 1.06 ratio         PTT - ( 21 Dec 2020 05:35 )  PTT:33.9 sec      RADIOLOGY & ADDITIONAL STUDIES:  < from: CT Brain Stroke Protocol (12.20.20 @ 20:32) >  EXAM:  CT BRAIN STROKE PROTOCOL          PROCEDURE DATE:  12/20/2020      INTERPRETATION:  CLINICAL HISTORY / REASON FOR EXAM: Right sided facial droop. Left sided ptosis.    TECHNIQUE: Multiple axial CT images of the head were obtained with sagittal and coronal reformats from the base of the skull to the vertex without the administration of IV contrast.    COMPARISON: None.      FINDINGS:    The ventricles and cerebral sulci are prominent, suggestive of age-related parenchymal volume loss.    There is no evidence of acute intracranial hemorrhage, mass effect or midline shift.    Gray-white differentiation is maintained. There are few patchy periventricular and subcortical white matter hypodensities that are nonspecific but typically attributed to chronic small vessel ischemic change.    There is no fracture to the calvarium. Predominantly vertebral atherosclerosis is noted.    Near complete opacification of the right mastoid air cells. Left mastoid air cells are unremarkable. The visualized portions of the sinuses are unremarkable.      IMPRESSION:    No CT evidence of acute intracranial pathology.    Right mastoid near complete opacification. Please correlate with possible infection or inflammation.    Mild chronic microvascular ischemic changes.    DAVINA CÉSAR M.D., RESIDENT RADIOLOGIST  This document has been electronically signed.  GRISEL AMOR MD; Attending Radiologist  This document has been electronically signed. Dec 20 2020  9:02PM    < end of copied text >

## 2020-12-21 NOTE — CHART NOTE - NSCHARTNOTEFT_GEN_A_CORE
Patient was seen and examined at bedside.   Patient offers no complains today.     T(C): 36.2 (12-21-20 @ 13:54), Max: 36.9 (12-20-20 @ 20:33)  HR: 83 (12-21-20 @ 13:54) (72 - 92)  BP: 185/86 (12-21-20 @ 13:54) (104/60 - 185/86)  RR: 18 (12-21-20 @ 13:54) (18 - 18)  SpO2: 97% (12-21-20 @ 12:33) (95% - 97%)    GENERAL: NAD, well-developed  HEAD:  Atraumatic, Normocephalic  EYES: EOMI, PERRLA, conjunctiva and sclera clear  ENT: Normal tympanic membrane. No nasal obstruction or discharge. No tonsillar exudate, swelling or erythema.  NECK: Supple, No JVD  CHEST/LUNG: Clear to auscultation bilaterally; No wheeze  HEART: Regular rate and rhythm; No murmurs, rubs, or gallops  ABDOMEN: Soft, Nontender, Nondistended; Bowel sounds present  EXTREMITIES:  2+ Peripheral Pulses, No clubbing, cyanosis, or edema  PSYCH: AAOx3  NEUROLOGY: right sided facial droop   SKIN: No rashes or lesions    71 yo M with pmhx of CAD s/p CABG, DM, HTN, epistaxis presented to the ED with Right sided facial droop and Left eye Ptosis this evening.     # Right sided Facial Paralysis, Left eye Ptosis r/o Vargas Hunt Syndrome  - NIHSS 2 on admission  - CTH negative for stroke, CTA negative for large vessel occlusion  - f/u MRI BRAIN WW/O Contrast, MRI IAC ww/o contrast  - c/w prednisone 60 mg q daily x 7 days then quick taper  - c/w Valtrex  -  f/u Lipid profile, hbaic, ESR, RPR, TSH, Lyme titres, ace level, ANCA, anti AChr antibodies,(binding blocking and modulating) anti musk antibody,   - f/u neurology recordations  - ENT consult pending     # COVID- 19 PNA  - 98% on room air  - obtain inflammatory markers  - supportive care for now    # HTN  - bp 185/86  - Metoprolol and Nifedipine    # MYESHA on CKD 3  - resolved   - Cr at baseline    # CAD s/p CABG  - c/w ASA, metoprolol     Type II DM  - c/w sliding scale insulin    # Ambulate as tolerated    # DASH diet    # DVT ppx  - c/w heparin    # Full code

## 2020-12-21 NOTE — H&P ADULT - ATTENDING COMMENTS
71 YO M with a PMH of CAD s/p CABG, DM2, HTN, and epistaxis who was BIBEMS for eval of right-sided facial droop that started today at 1430. Associated with left eye twitching that started yesterday. ROS positive for right ear pain/discharge of yellow fluid. The pt denies any sensation/visual changes, or headache. In the ED, STROKE CODE activation for NIHSS of 2 (Right facial palsy). CTH was negative for acute changes. CTP/CTA was negative for acute process. No tPA administered due to pt being out of the window. Neuro consulted and recommended the pt be admitted to tele unit, MRI, echo, start prednisone/Valtrex, and neurochecks. also, wants to send the following:  -Lipid profile, hbaic, ESR, RPR, TSH, Lyme titres, ace level, ANCA, anti AChr antibodies (binding blocking and modulating) anti musk antibody,     Physical exam showed pt in NAD. VSS, afebrile, not hypoxic on RA. A&Ox3. Right sided facial droop involving upper and lower quadrant with left eye ptosis/twitching. 5/5 strength in RUE/RLE/LUE/LLE, otherwise negative. No sensation changes. CNs are intact. No dysarthria. CTA B/L. RRR, no M/G/R. ABD is soft and non-tender. LEs without swelling. Labs and radiology as above.     Right-sided facial droop + left eye ptosis/twitching, likely from bells palsy vs daniel hunt syndrome vs CVA. Neuro is following. Tele admit. Prednisone/Valtrex. MRI. Echo. A1c, lipids, and TSH. Folate/B12. Neurochecks. ENT eval.  -FU ESR, RPR, TSH, Lyme titres, ace level, ANCA, anti AChr antibodies (binding blocking and modulating) anti musk antibody    COVID19; no sepsis on admission. - Recent travel. - COVID19 contact. Admit to COVID19 isolation unit. Isolate pt. Send CRP and procal. Send Coags. Obtain LDH & D-dimer. Trend CBC, CK, and LFTs. Send ferritin and fibrinogen. FU official Chest XR report. IV Steroids. Remdesivir protocol. IVFs (LR). APAP PRN. Anti-tussives PRN. Supplemental O2 PRN. ID consult. Lovenox.    MYESHA vs CKD3. Send UA, urine lytes, urine pr:cr ratio, urine eosinophils, and trend BMP. IVFs (LR). Monitor urinary out-put. Hold nephrotoxic agents.     Diabetes mellitus with hyperglycemia. A1c. FSs. Insulin PRN.     Hx of CAD s/p CABG, HTN, and epistaxis. Restart home meds. DVT PPX. Inform PCP of pt's admission to hospital. My note supersedes the residents note.

## 2020-12-21 NOTE — CONSULT NOTE ADULT - SUBJECTIVE AND OBJECTIVE BOX
YAHIR OSPINA  70y, Male  Allergy: No Known Allergies      CHIEF COMPLAINT: right sided facial paralysis (21 Dec 2020 13:14)      LOS  1d    HPI:  71 yo M with pmhx of CAD s/p CABG, DM, HTN, epistaxis presented to the ED with Right sided facial droop and Left eye Ptosis this evening. Patient states that he noticed a left eyelid droop since yesterday. His wife noticed left facial droop and mild slurring of speech, while eating dinner yesterday, and activated 911. He denies any preceding events or previously similar episodes. Patient also states that his right hear has been hurting for the past week with increased discharge, and tenderness with palpation. He otherwise currently admits to some blurriness in his left eye (wears glasses at baseline and light sensitivity.  Of note, patient admits to having chicken pox when younger.  to the ED, Code stroke was activated, with NIHSS score of 2. Patient currently denies any chest pain, other focal deficits, abdominal discomfort, headaches, shortness of breath, and remains with right sided facial paralysis.     ED course: Vitals stable on admission. Stroke code activated, patient out of TPA window. CTH negative for stroke, CTA with no large vessel occlusion, but has Right mastoid complete opacification, with suspected inflammation. COVID 19 + in ED, CXR pending. No leukocytosis, with elevated creatinine (21 Dec 2020 00:10)      INFECTIOUS DISEASE HISTORY:  Reports posterior ear pain associated with drainage, Says symptoms started 10 days ago. Was to get checked by primary care, but did not go due to covid.   Reports that his wife's co-worker with recent COVID infection, and had contact with wife. Both he and wife were tested this past Monday and were found to be negative.   Deneis any coughing, dyspnea, URI, nausea, vomiting, abdominal pain.  Noted left eye ptosis day prior to admission.   Denies any fevers, chills, headaches, pain on left side of face.     PAST MEDICAL & SURGICAL HISTORY:  CAD (coronary artery disease)  s/p CABG X3    Hypercholesteremia    Diabetes    HTN (hypertension)    S/P CABG (coronary artery bypass graft)        FAMILY HISTORY  Family history of brain aneurysm (Mother)        SOCIAL HISTORY  Social History:  Marital Status:  ( X  )    (   ) Single    (   )    (  )   Lives with: (  ) alone  (  ) children   (  ) spouse   (  ) parents  (  ) other  Recent Travel: No recent travel      Substance Use (street drugs): ( x ) never used  (  ) other:  Tobacco Usage:  ( x  ) never smoked   (   ) former smoker   (   ) current smoker  (     ) pack year  Alcohol Usage: None   Baseline mobility: independant (21 Dec 2020 00:10)        ROS  General: Denies rigors, nightsweats  HEENT: Denies headache, rhinorrhea, sore throat, eye pain  CV: Denies CP, palpitations  PULM: Denies wheezing, hemoptysis  GI: Denies hematemesis, hematochezia, melena  : Denies discharge, hematuria  MSK: Denies arthralgias, myalgias  SKIN: Denies rash, lesions  NEURO: Denies paresthesias, weakness  PSYCH: Denies depression, anxiety    VITALS:  T(F): 97.1, Max: 98.4 (12-20-20 @ 20:33)  HR: 83  BP: 185/86  RR: 18Vital Signs Last 24 Hrs  T(C): 36.2 (21 Dec 2020 13:54), Max: 36.9 (20 Dec 2020 20:33)  T(F): 97.1 (21 Dec 2020 13:54), Max: 98.4 (20 Dec 2020 20:33)  HR: 83 (21 Dec 2020 13:54) (72 - 92)  BP: 185/86 (21 Dec 2020 13:54) (104/60 - 185/86)  BP(mean): --  RR: 18 (21 Dec 2020 13:54) (18 - 18)  SpO2: 97% (21 Dec 2020 12:33) (95% - 97%)    PHYSICAL EXAM:  Gen: NAD, resting in bed  HEENT: Normocephalic, atraumatic; left eye ptosis and facial droop   Neck: supple, no lymphadenopathy  CV: Regular rate & regular rhythm  Lungs: decreased BS at bases, no fremitus  Abdomen: Soft, BS present  Ext: Warm, well perfused  Neuro: non focal, awake  Skin: no rash, no erythema  Lines: no phlebitis    TESTS & MEASUREMENTS:                        12.3   6.72  )-----------( 213      ( 21 Dec 2020 05:35 )             38.2     12-21    138  |  102  |  28<H>  ----------------------------<  163<H>  4.6   |  25  |  1.5    Ca    9.4      21 Dec 2020 05:35  Mg     2.1     12-21    TPro  7.3  /  Alb  4.0  /  TBili  0.9  /  DBili  x   /  AST  23  /  ALT  24  /  AlkPhos  71  12-21    eGFR if Non African American: 46 mL/min/1.73M2 (12-21-20 @ 05:35)  eGFR if African American: 53 mL/min/1.73M2 (12-21-20 @ 05:35)  eGFR if Non African American: 32 mL/min/1.73M2 (12-20-20 @ 20:27)  eGFR if : 38 mL/min/1.73M2 (12-20-20 @ 20:27)    LIVER FUNCTIONS - ( 21 Dec 2020 05:35 )  Alb: 4.0 g/dL / Pro: 7.3 g/dL / ALK PHOS: 71 U/L / ALT: 24 U/L / AST: 23 U/L / GGT: x                     INFECTIOUS DISEASES TESTING      RADIOLOGY & ADDITIONAL TESTS:  I have personally reviewed the last Chest xray  CXR  Xray Chest 1 View- PORTABLE-Urgent:   EXAM:  XR CHEST PORTABLE URGENT 1V            PROCEDURE DATE:  12/21/2020            INTERPRETATION:  Clinical History / Reason for exam: Shortness of breath    Comparison : Chest radiograph 4/15/2011    Technique/Positioning: Single frontal chest radiograph.    Findings:    Support devices: None.    Cardiac/mediastinum/hilum: Normal size heart. Surgical clips overlying the mediastinum.    Lung parenchyma/Pleura: No focal pulmonary consolidation. No pneumothorax.    Skeleton/soft tissues: Unremarkable.    Impression:    No focal pulmonary consolidation.              NORAH KHANNA MD; Attending Radiologist  This document has been electronically signed. Dec 21 2020 10:42AM (12-21-20 @ 02:26)      CT      CARDIOLOGY TESTING  12 Lead ECG:   Ventricular Rate 77 BPM    Atrial Rate 77 BPM    P-R Interval 200 ms    QRS Duration 90 ms    Q-T Interval 392 ms    QTC Calculation(Bazett) 443 ms    P Axis 31 degrees    R Axis 29 degrees    T Axis 4 degrees    Diagnosis Line Normal sinus rhythm  Minimal voltage criteria for LVH, may be normal variant ( R in aVL )  Inferior infarct , age undetermined  Anterior infarct , age undetermined  Abnormal ECG    Confirmed by DUANE BHAGAT MD ([id number]) on 12/20/2020 10:08:40 PM (12-20-20 @ 20:39)      MEDICATIONS  artificial  tears Solution 1 Both EYES daily  heparin   Injectable 5000 SubCutaneous every 8 hours  hydrALAZINE 25 Oral once  lactated ringers. 1000 IV Continuous <Continuous>  petrolatum Ophthalmic Ointment 1 Both EYES at bedtime  predniSONE   Tablet 60 Oral daily  valACYclovir Oral Liquid - Peds 500 Oral daily      Weight  Weight (kg): 91.6 (12-20-20 @ 20:33)    ANTIBIOTICS:  valACYclovir Oral Liquid - Peds 500 milliGRAM(s) Oral daily      ALLERGIES:  No Known Allergies

## 2020-12-21 NOTE — H&P ADULT - NSHPREVIEWOFSYSTEMS_GEN_ALL_CORE
REVIEW OF SYSTEMS:    CONSTITUTIONAL: No weakness, fevers or chills  EYES/ENT: right ear pain, tenderness to palpation, purulent discharge   NECK: No pain or stiffness  RESPIRATORY: No cough, wheezing, hemoptysis; No shortness of breath  CARDIOVASCULAR: No chest pain or palpitations  GASTROINTESTINAL: No abdominal or epigastric pain. No nausea, vomiting, or hematemesis; No diarrhea or constipation. No melena or hematochezia.  GENITOURINARY: No dysuria, frequency or hematuria  NEUROLOGICAL: right sided facial droop, left eye ptosis  SKIN: No itching, rashes

## 2020-12-22 LAB
ANION GAP SERPL CALC-SCNC: 11 MMOL/L — SIGNIFICANT CHANGE UP (ref 7–14)
BASOPHILS # BLD AUTO: 0.01 K/UL — SIGNIFICANT CHANGE UP (ref 0–0.2)
BASOPHILS NFR BLD AUTO: 0.1 % — SIGNIFICANT CHANGE UP (ref 0–1)
BUN SERPL-MCNC: 21 MG/DL — HIGH (ref 10–20)
CALCIUM SERPL-MCNC: 9.6 MG/DL — SIGNIFICANT CHANGE UP (ref 8.5–10.1)
CHLORIDE SERPL-SCNC: 103 MMOL/L — SIGNIFICANT CHANGE UP (ref 98–110)
CO2 SERPL-SCNC: 24 MMOL/L — SIGNIFICANT CHANGE UP (ref 17–32)
CREAT SERPL-MCNC: 1.4 MG/DL — SIGNIFICANT CHANGE UP (ref 0.7–1.5)
CRP SERPL-MCNC: 0.85 MG/DL — HIGH (ref 0–0.4)
EOSINOPHIL # BLD AUTO: 0.02 K/UL — SIGNIFICANT CHANGE UP (ref 0–0.7)
EOSINOPHIL NFR BLD AUTO: 0.2 % — SIGNIFICANT CHANGE UP (ref 0–8)
ERYTHROCYTE [SEDIMENTATION RATE] IN BLOOD: 54 MM/HR — HIGH (ref 0–10)
GLUCOSE BLDC GLUCOMTR-MCNC: 166 MG/DL — HIGH (ref 70–99)
GLUCOSE BLDC GLUCOMTR-MCNC: 211 MG/DL — HIGH (ref 70–99)
GLUCOSE BLDC GLUCOMTR-MCNC: 252 MG/DL — HIGH (ref 70–99)
GLUCOSE BLDC GLUCOMTR-MCNC: 265 MG/DL — HIGH (ref 70–99)
GLUCOSE SERPL-MCNC: 165 MG/DL — HIGH (ref 70–99)
HCT VFR BLD CALC: 39.1 % — LOW (ref 42–52)
HGB BLD-MCNC: 12.6 G/DL — LOW (ref 14–18)
IMM GRANULOCYTES NFR BLD AUTO: 0.5 % — HIGH (ref 0.1–0.3)
LYMPHOCYTES # BLD AUTO: 0.96 K/UL — LOW (ref 1.2–3.4)
LYMPHOCYTES # BLD AUTO: 11.3 % — LOW (ref 20.5–51.1)
MCHC RBC-ENTMCNC: 26.6 PG — LOW (ref 27–31)
MCHC RBC-ENTMCNC: 32.2 G/DL — SIGNIFICANT CHANGE UP (ref 32–37)
MCV RBC AUTO: 82.5 FL — SIGNIFICANT CHANGE UP (ref 80–94)
MONOCYTES # BLD AUTO: 0.77 K/UL — HIGH (ref 0.1–0.6)
MONOCYTES NFR BLD AUTO: 9 % — SIGNIFICANT CHANGE UP (ref 1.7–9.3)
NEUTROPHILS # BLD AUTO: 6.71 K/UL — HIGH (ref 1.4–6.5)
NEUTROPHILS NFR BLD AUTO: 78.9 % — HIGH (ref 42.2–75.2)
NRBC # BLD: 0 /100 WBCS — SIGNIFICANT CHANGE UP (ref 0–0)
PLATELET # BLD AUTO: 215 K/UL — SIGNIFICANT CHANGE UP (ref 130–400)
POTASSIUM SERPL-MCNC: 4.7 MMOL/L — SIGNIFICANT CHANGE UP (ref 3.5–5)
POTASSIUM SERPL-SCNC: 4.7 MMOL/L — SIGNIFICANT CHANGE UP (ref 3.5–5)
PROCALCITONIN SERPL-MCNC: 0.08 NG/ML — SIGNIFICANT CHANGE UP (ref 0.02–0.1)
RBC # BLD: 4.74 M/UL — SIGNIFICANT CHANGE UP (ref 4.7–6.1)
RBC # FLD: 13.7 % — SIGNIFICANT CHANGE UP (ref 11.5–14.5)
SODIUM SERPL-SCNC: 138 MMOL/L — SIGNIFICANT CHANGE UP (ref 135–146)
TSH SERPL-MCNC: 2.72 UIU/ML — SIGNIFICANT CHANGE UP (ref 0.27–4.2)
URATE UR-MCNC: 49.4 MG/DL — SIGNIFICANT CHANGE UP
WBC # BLD: 8.51 K/UL — SIGNIFICANT CHANGE UP (ref 4.8–10.8)
WBC # FLD AUTO: 8.51 K/UL — SIGNIFICANT CHANGE UP (ref 4.8–10.8)

## 2020-12-22 PROCEDURE — 70487 CT MAXILLOFACIAL W/DYE: CPT | Mod: 26

## 2020-12-22 PROCEDURE — 99233 SBSQ HOSP IP/OBS HIGH 50: CPT | Mod: CS

## 2020-12-22 PROCEDURE — 99221 1ST HOSP IP/OBS SF/LOW 40: CPT | Mod: CS

## 2020-12-22 PROCEDURE — 99232 SBSQ HOSP IP/OBS MODERATE 35: CPT

## 2020-12-22 RX ORDER — INSULIN GLARGINE 100 [IU]/ML
12 INJECTION, SOLUTION SUBCUTANEOUS AT BEDTIME
Refills: 0 | Status: DISCONTINUED | OUTPATIENT
Start: 2020-12-22 | End: 2020-12-24

## 2020-12-22 RX ORDER — METOPROLOL TARTRATE 50 MG
50 TABLET ORAL
Refills: 0 | Status: DISCONTINUED | OUTPATIENT
Start: 2020-12-22 | End: 2020-12-24

## 2020-12-22 RX ORDER — DEXAMETHASONE 0.5 MG/5ML
6 ELIXIR ORAL EVERY 8 HOURS
Refills: 0 | Status: DISCONTINUED | OUTPATIENT
Start: 2020-12-22 | End: 2020-12-24

## 2020-12-22 RX ORDER — INSULIN LISPRO 100/ML
VIAL (ML) SUBCUTANEOUS
Refills: 0 | Status: DISCONTINUED | OUTPATIENT
Start: 2020-12-22 | End: 2020-12-24

## 2020-12-22 RX ORDER — ENOXAPARIN SODIUM 100 MG/ML
40 INJECTION SUBCUTANEOUS DAILY
Refills: 0 | Status: DISCONTINUED | OUTPATIENT
Start: 2020-12-22 | End: 2020-12-24

## 2020-12-22 RX ORDER — VALACYCLOVIR 500 MG/1
500 TABLET, FILM COATED ORAL DAILY
Refills: 0 | Status: DISCONTINUED | OUTPATIENT
Start: 2020-12-22 | End: 2020-12-24

## 2020-12-22 RX ORDER — ASPIRIN/CALCIUM CARB/MAGNESIUM 324 MG
81 TABLET ORAL DAILY
Refills: 0 | Status: DISCONTINUED | OUTPATIENT
Start: 2020-12-22 | End: 2020-12-24

## 2020-12-22 RX ORDER — INSULIN LISPRO 100/ML
5 VIAL (ML) SUBCUTANEOUS
Refills: 0 | Status: DISCONTINUED | OUTPATIENT
Start: 2020-12-22 | End: 2020-12-24

## 2020-12-22 RX ORDER — LOSARTAN POTASSIUM 100 MG/1
100 TABLET, FILM COATED ORAL DAILY
Refills: 0 | Status: DISCONTINUED | OUTPATIENT
Start: 2020-12-22 | End: 2020-12-24

## 2020-12-22 RX ORDER — PANTOPRAZOLE SODIUM 20 MG/1
40 TABLET, DELAYED RELEASE ORAL
Refills: 0 | Status: DISCONTINUED | OUTPATIENT
Start: 2020-12-22 | End: 2020-12-24

## 2020-12-22 RX ORDER — ATORVASTATIN CALCIUM 80 MG/1
40 TABLET, FILM COATED ORAL AT BEDTIME
Refills: 0 | Status: DISCONTINUED | OUTPATIENT
Start: 2020-12-22 | End: 2020-12-24

## 2020-12-22 RX ORDER — NIFEDIPINE 30 MG
30 TABLET, EXTENDED RELEASE 24 HR ORAL DAILY
Refills: 0 | Status: DISCONTINUED | OUTPATIENT
Start: 2020-12-22 | End: 2020-12-24

## 2020-12-22 RX ADMIN — AMPICILLIN SODIUM AND SULBACTAM SODIUM 200 GRAM(S): 250; 125 INJECTION, POWDER, FOR SUSPENSION INTRAMUSCULAR; INTRAVENOUS at 00:02

## 2020-12-22 RX ADMIN — VALACYCLOVIR 500 MILLIGRAM(S): 500 TABLET, FILM COATED ORAL at 12:32

## 2020-12-22 RX ADMIN — AMPICILLIN SODIUM AND SULBACTAM SODIUM 200 GRAM(S): 250; 125 INJECTION, POWDER, FOR SUSPENSION INTRAMUSCULAR; INTRAVENOUS at 12:26

## 2020-12-22 RX ADMIN — PANTOPRAZOLE SODIUM 40 MILLIGRAM(S): 20 TABLET, DELAYED RELEASE ORAL at 17:25

## 2020-12-22 RX ADMIN — AMPICILLIN SODIUM AND SULBACTAM SODIUM 200 GRAM(S): 250; 125 INJECTION, POWDER, FOR SUSPENSION INTRAMUSCULAR; INTRAVENOUS at 06:05

## 2020-12-22 RX ADMIN — Medication 1 APPLICATION(S): at 21:30

## 2020-12-22 RX ADMIN — ATORVASTATIN CALCIUM 40 MILLIGRAM(S): 80 TABLET, FILM COATED ORAL at 21:29

## 2020-12-22 RX ADMIN — AMPICILLIN SODIUM AND SULBACTAM SODIUM 200 GRAM(S): 250; 125 INJECTION, POWDER, FOR SUSPENSION INTRAMUSCULAR; INTRAVENOUS at 17:24

## 2020-12-22 RX ADMIN — HEPARIN SODIUM 5000 UNIT(S): 5000 INJECTION INTRAVENOUS; SUBCUTANEOUS at 06:02

## 2020-12-22 RX ADMIN — Medication 1 DROP(S): at 12:27

## 2020-12-22 RX ADMIN — HEPARIN SODIUM 5000 UNIT(S): 5000 INJECTION INTRAVENOUS; SUBCUTANEOUS at 17:19

## 2020-12-22 RX ADMIN — SODIUM CHLORIDE 75 MILLILITER(S): 9 INJECTION, SOLUTION INTRAVENOUS at 06:06

## 2020-12-22 RX ADMIN — INSULIN GLARGINE 12 UNIT(S): 100 INJECTION, SOLUTION SUBCUTANEOUS at 22:06

## 2020-12-22 RX ADMIN — Medication 6 MILLIGRAM(S): at 17:19

## 2020-12-22 RX ADMIN — AMPICILLIN SODIUM AND SULBACTAM SODIUM 200 GRAM(S): 250; 125 INJECTION, POWDER, FOR SUSPENSION INTRAMUSCULAR; INTRAVENOUS at 23:56

## 2020-12-22 RX ADMIN — Medication 50 MILLIGRAM(S): at 17:27

## 2020-12-22 RX ADMIN — Medication 6 MILLIGRAM(S): at 21:29

## 2020-12-22 NOTE — PROGRESS NOTE ADULT - SUBJECTIVE AND OBJECTIVE BOX
YAHIR OSPINA 70y Male  MRN#: [id number]   Hospital Day: 2d    SUBJECTIVE  Patient is a 70y old Male who presents with a chief complaint of right sided facial paralysis (22 Dec 2020 11:57)  Currently admitted to medicine with the primary diagnosis of Facial droop      INTERVAL HPI AND OVERNIGHT EVENTS:  Patient was examined and seen at bedside. This morning he is resting comfortably in bed and reports no issues or overnight events. Left ptosis and right facial paralysis are still present.    REVIEW OF SYMPTOMS:  CONSTITUTIONAL: No weakness, fevers or chills; No headaches  EYES: No visual changes, eye pain, or discharge  ENT: No vertigo; No ear pain or change in hearing; No sore throat or difficulty swallowing  NECK: No pain or stiffness  RESPIRATORY: No cough, wheezing, or hemoptysis; No shortness of breath  CARDIOVASCULAR: No chest pain or palpitations  GASTROINTESTINAL: No abdominal or epigastric pain; No nausea, vomiting, or hematemesis; No diarrhea or constipation; No melena or hematochezia  GENITOURINARY: No dysuria, frequency or hematuria  MUSCULOSKELETAL: No joint pain, no muscle pain, no weakness  NEUROLOGICAL: left eye weakness and right facial paralysis  SKIN: No itching or rashes    OBJECTIVE  PAST MEDICAL & SURGICAL HISTORY  CAD (coronary artery disease)  s/p CABG X3    Hypercholesteremia    Diabetes    HTN (hypertension)    S/P CABG (coronary artery bypass graft)      ALLERGIES:  No Known Allergies    MEDICATIONS:  STANDING MEDICATIONS  ampicillin/sulbactam  IVPB 3 Gram(s) IV Intermittent every 6 hours  artificial  tears Solution 1 Drop(s) Both EYES daily  dexAMETHasone  Injectable 6 milliGRAM(s) IV Push every 8 hours  heparin   Injectable 5000 Unit(s) SubCutaneous every 8 hours  lactated ringers. 1000 milliLiter(s) IV Continuous <Continuous>  LORazepam   Injectable 2 milliGRAM(s) IV Push once  petrolatum Ophthalmic Ointment 1 Application(s) Both EYES at bedtime  valACYclovir 500 milliGRAM(s) Oral daily    PRN MEDICATIONS      VITAL SIGNS: Last 24 Hours  T(C): 36.2 (22 Dec 2020 13:57), Max: 36.8 (21 Dec 2020 21:35)  T(F): 97.2 (22 Dec 2020 13:57), Max: 98.3 (21 Dec 2020 21:35)  HR: 82 (22 Dec 2020 13:57) (80 - 92)  BP: 168/78 (22 Dec 2020 13:57) (133/65 - 168/78)  BP(mean): --  RR: 18 (22 Dec 2020 13:57) (18 - 18)  SpO2: 96% (22 Dec 2020 04:29) (95% - 97%)    LABS:                        12.3   6.72  )-----------( 213      ( 21 Dec 2020 05:35 )             38.2     12-21    138  |  102  |  28<H>  ----------------------------<  163<H>  4.6   |  25  |  1.5    Ca    9.4      21 Dec 2020 05:35  Mg     2.1     12-21    TPro  7.3  /  Alb  4.0  /  TBili  0.9  /  DBili  x   /  AST  23  /  ALT  24  /  AlkPhos  71  12-21    PT/INR - ( 21 Dec 2020 05:35 )   PT: 12.20 sec;   INR: 1.06 ratio         PTT - ( 21 Dec 2020 05:35 )  PTT:33.9 sec      Sedimentation Rate, Erythrocyte: 54 mm/Hr <H> (12-22-20 @ 11:00)      CARDIAC MARKERS ( 20 Dec 2020 20:27 )  x     / <0.01 ng/mL / x     / x     / x          RADIOLOGY:  < from: CT Brain Stroke Protocol (12.20.20 @ 20:32) >  IMPRESSION:    No CT evidence of acute intracranial pathology.  Right mastoid near complete opacification. Please correlate with possible infection or inflammation.  Mild chronic microvascular ischemic changes.  < end of copied text >    < from: CT Angio Head w/ IV Cont (12.20.20 @ 21:56) >  IMPRESSION:    CTA HEAD: No evidence of flow-limiting stenosis, occlusion or aneurysm.  CTA NECK: No evidence of carotid or vertebral artery stenosis.  < end of copied text >    < from: MR IAC No Cont (12.21.20 @ 22:53) >  IMPRESSION:    No acute intracranial pathology.    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 The left oculomotor nerve is noted to be mildly displaced inferiorly by the left PCA and abuts the inferior aspect of the left PCA, this is of uncertain clinical significance but can be seen in a setting of neurovascular compression.    Extensive right mastoid effusions, nonspecific, correlate for otomastoiditis.  < end of copied text >      PHYSICAL EXAM:  CONSTITUTIONAL: No acute distress, well-developed, well-groomed, AAOx3  HEAD: Atraumatic, normocephalic  EYES: EOM intact, PERRLA, conjunctiva and sclera clear  ENT: Supple, no masses, no thyromegaly, no bruits, no JVD; moist mucous membranes  PULMONARY: Clear to auscultation bilaterally; no wheezes, rales, or rhonchi  CARDIOVASCULAR: Regular rate and rhythm; no murmurs, rubs, or gallops  GASTROINTESTINAL: Soft, non-tender, non-distended; bowel sounds present  MUSCULOSKELETAL: 2+ peripheral pulses; no clubbing, no cyanosis, no edema  NEUROLOGY: Left ptosis and right facial paralysis  SKIN: No rashes or lesions; warm and dry    ASSESSMENT & PLAN:  Patient is a 69yo male with PMH of CAD s/p CABG, diabetes mellitus, hypertension, hyperlipidemia, and epistaxis who presented to the ED complaining of     #COVID-19 pneumonia, asymptomatic  - COVID-19 PCR positive  - Isolation precautions (contact, droplet, airborne)  - Chest X-ray:  - Patient is saturating 96% on room air  - D-Dimer Assay, Quantitative: 148 ng/mL DDU (12-21-20 @ 05:35)  - Follow other inflammatory markers  - Repeat inflammatory markers (D-dimer, CRP, ferritin) Q48-72H if clinically worsening  - ID evaluation for possible remdesivir/plasma  - Active type and screen in case of plasma  - Incentive spirometry at bedside  - Start dexamethasone 6mg IV QD for 10 days  - DVT prophylaxis per protocol  - Titrate supplemental O2 to maintain SpO2 92-96%    #Acute kidney injury on CKD stage 3, improving  - Possibly pre-renal vs ATN  - Baseline creatinine: 13 (10/17/2018)  - Creatinine: 2.0->1.5  - Avoid nephrotoxic agents  - Monitor BUN/creatinine   - Continue with LR at 75mL/hr    #CAD s/p CABG x3  - Continue with aspirin 81mg PO once daily, atorvastatin 40mg PO at bedtime, metoprolol tartrate 50mg PO twice daily    #Diabetes mellitus type 2  - Hemoglobin A1c in AM  - Hold home oral medications  - Start insulin glargine 12 units SQ QHS  - Start insulin lispro 5 units SQ TID  - Insulin sliding scale coverage  - Monitor fingerstick glucose, especially as patient will be on IV steroids    #Hypertension  - Continue with metoprolol tartrate 50mg PO twice daily and nifedipine 30mg PO once daily  - Hold home medication losartan-hydrochlorothiazide 100-12.5mg due to MYESHA    #Hyperlipidemia  - Continue with atorvastatin 40mg PO at bedtime  - Follow lipid profile    #Misc  - DVT Prophylaxis: heparin 5000 units SQ Q8H   - GI Prophylaxis: pantoprazole 40mg PO once daily   - Diet: DASH/TLC  - Activity: ambulate as tolerated  - IV Fluids: LR at 75mL/hr  - Code Status: Full Code    Dispo: acute  YAHIR OSPINA 70y Male  MRN#: [id number]   Hospital Day: 2d    SUBJECTIVE  Patient is a 70y old Male who presents with a chief complaint of right sided facial paralysis (22 Dec 2020 11:57)  Currently admitted to medicine with the primary diagnosis of Facial droop      INTERVAL HPI AND OVERNIGHT EVENTS:  Patient was examined and seen at bedside. This morning he is resting comfortably in bed and reports no issues or overnight events. Left ptosis and right facial paralysis are still present.    REVIEW OF SYMPTOMS:  CONSTITUTIONAL: No weakness, fevers or chills; No headaches  EYES: No visual changes, eye pain, or discharge  ENT: No vertigo; No ear pain or change in hearing; No sore throat or difficulty swallowing  NECK: No pain or stiffness  RESPIRATORY: No cough, wheezing, or hemoptysis; No shortness of breath  CARDIOVASCULAR: No chest pain or palpitations  GASTROINTESTINAL: No abdominal or epigastric pain; No nausea, vomiting, or hematemesis; No diarrhea or constipation; No melena or hematochezia  GENITOURINARY: No dysuria, frequency or hematuria  MUSCULOSKELETAL: No joint pain, no muscle pain, no weakness  NEUROLOGICAL: left eye weakness and right facial paralysis  SKIN: No itching or rashes    OBJECTIVE  PAST MEDICAL & SURGICAL HISTORY  CAD (coronary artery disease)  s/p CABG X3    Hypercholesteremia    Diabetes    HTN (hypertension)    S/P CABG (coronary artery bypass graft)      ALLERGIES:  No Known Allergies    MEDICATIONS:  STANDING MEDICATIONS  ampicillin/sulbactam  IVPB 3 Gram(s) IV Intermittent every 6 hours  artificial  tears Solution 1 Drop(s) Both EYES daily  dexAMETHasone  Injectable 6 milliGRAM(s) IV Push every 8 hours  heparin   Injectable 5000 Unit(s) SubCutaneous every 8 hours  lactated ringers. 1000 milliLiter(s) IV Continuous <Continuous>  LORazepam   Injectable 2 milliGRAM(s) IV Push once  petrolatum Ophthalmic Ointment 1 Application(s) Both EYES at bedtime  valACYclovir 500 milliGRAM(s) Oral daily    PRN MEDICATIONS      VITAL SIGNS: Last 24 Hours  T(C): 36.2 (22 Dec 2020 13:57), Max: 36.8 (21 Dec 2020 21:35)  T(F): 97.2 (22 Dec 2020 13:57), Max: 98.3 (21 Dec 2020 21:35)  HR: 82 (22 Dec 2020 13:57) (80 - 92)  BP: 168/78 (22 Dec 2020 13:57) (133/65 - 168/78)  BP(mean): --  RR: 18 (22 Dec 2020 13:57) (18 - 18)  SpO2: 96% (22 Dec 2020 04:29) (95% - 97%)    LABS:                        12.3   6.72  )-----------( 213      ( 21 Dec 2020 05:35 )             38.2     12-21    138  |  102  |  28<H>  ----------------------------<  163<H>  4.6   |  25  |  1.5    Ca    9.4      21 Dec 2020 05:35  Mg     2.1     12-21    TPro  7.3  /  Alb  4.0  /  TBili  0.9  /  DBili  x   /  AST  23  /  ALT  24  /  AlkPhos  71  12-21    PT/INR - ( 21 Dec 2020 05:35 )   PT: 12.20 sec;   INR: 1.06 ratio         PTT - ( 21 Dec 2020 05:35 )  PTT:33.9 sec      Sedimentation Rate, Erythrocyte: 54 mm/Hr <H> (12-22-20 @ 11:00)      CARDIAC MARKERS ( 20 Dec 2020 20:27 )  x     / <0.01 ng/mL / x     / x     / x          RADIOLOGY:  < from: CT Brain Stroke Protocol (12.20.20 @ 20:32) >  IMPRESSION:    No CT evidence of acute intracranial pathology.  Right mastoid near complete opacification. Please correlate with possible infection or inflammation.  Mild chronic microvascular ischemic changes.  < end of copied text >    < from: CT Angio Head w/ IV Cont (12.20.20 @ 21:56) >  IMPRESSION:    CTA HEAD: No evidence of flow-limiting stenosis, occlusion or aneurysm.  CTA NECK: No evidence of carotid or vertebral artery stenosis.  < end of copied text >    < from: MR IAC No Cont (12.21.20 @ 22:53) >  IMPRESSION:    No acute intracranial pathology.    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 The left oculomotor nerve is noted to be mildly displaced inferiorly by the left PCA and abuts the inferior aspect of the left PCA, this is of uncertain clinical significance but can be seen in a setting of neurovascular compression.    Extensive right mastoid effusions, nonspecific, correlate for otomastoiditis.  < end of copied text >      PHYSICAL EXAM:  CONSTITUTIONAL: No acute distress, well-developed, well-groomed, AAOx3  HEAD: Atraumatic, normocephalic  EYES: EOM intact, PERRLA, conjunctiva and sclera clear  ENT: Supple, no masses, no thyromegaly, no bruits, no JVD; moist mucous membranes  PULMONARY: Clear to auscultation bilaterally; no wheezes, rales, or rhonchi  CARDIOVASCULAR: Regular rate and rhythm; no murmurs, rubs, or gallops  GASTROINTESTINAL: Soft, non-tender, non-distended; bowel sounds present  MUSCULOSKELETAL: 2+ peripheral pulses; no clubbing, no cyanosis, no edema  NEUROLOGY: Left ptosis and right facial paralysis  SKIN: No rashes or lesions; warm and dry    ASSESSMENT & PLAN:  Patient is a 69yo male with PMH of CAD s/p CABG, diabetes mellitus, hypertension, hyperlipidemia, and epistaxis who presented to the ED complaining of right-sided facial droop and left eye droop starting the evening of presentation.    #Right-sided facial paralysis possibly secondary     #COVID-19 pneumonia, asymptomatic  - COVID-19 PCR positive  - Isolation precautions (contact, droplet, airborne)  - Chest X-ray:  - Patient is saturating 96% on room air  - D-Dimer Assay, Quantitative: 148 ng/mL DDU (12-21-20 @ 05:35)  - Follow other inflammatory markers  - Repeat inflammatory markers (D-dimer, CRP, ferritin) Q48-72H if clinically worsening  - ID evaluation for possible remdesivir/plasma  - Active type and screen in case of plasma  - Incentive spirometry at bedside  - Start dexamethasone 6mg IV QD for 10 days  - DVT prophylaxis per protocol  - Titrate supplemental O2 to maintain SpO2 92-96%    #Acute kidney injury on CKD stage 3, improving  - Possibly pre-renal vs ATN  - Baseline creatinine: 13 (10/17/2018)  - Creatinine: 2.0->1.5  - Avoid nephrotoxic agents  - Monitor BUN/creatinine   - Continue with LR at 75mL/hr    #CAD s/p CABG x3  - Continue with aspirin 81mg PO once daily, atorvastatin 40mg PO at bedtime, metoprolol tartrate 50mg PO twice daily    #Diabetes mellitus type 2  - Hemoglobin A1c in AM  - Hold home oral medications  - Start insulin glargine 12 units SQ QHS  - Start insulin lispro 5 units SQ TID  - Insulin sliding scale coverage  - Monitor fingerstick glucose, especially as patient will be on IV steroids    #Hypertension  - Continue with metoprolol tartrate 50mg PO twice daily and nifedipine 30mg PO once daily  - Hold home medication losartan-hydrochlorothiazide 100-12.5mg due to MYESHA    #Hyperlipidemia  - Continue with atorvastatin 40mg PO at bedtime  - Follow lipid profile    #Misc  - DVT Prophylaxis: heparin 5000 units SQ Q8H   - GI Prophylaxis: pantoprazole 40mg PO once daily   - Diet: DASH/TLC  - Activity: ambulate as tolerated  - IV Fluids: LR at 75mL/hr  - Code Status: Full Code    Dispo: acute  YAHIR OSPINA 70y Male  MRN#: [id number]   Hospital Day: 2d    SUBJECTIVE  Patient is a 70y old Male who presents with a chief complaint of right sided facial paralysis (22 Dec 2020 11:57)  Currently admitted to medicine with the primary diagnosis of Facial droop      INTERVAL HPI AND OVERNIGHT EVENTS:  Patient was examined and seen at bedside. This morning he is resting comfortably in bed and reports no issues or overnight events. Left ptosis and right facial paralysis are still present.    REVIEW OF SYMPTOMS:  CONSTITUTIONAL: No weakness, fevers or chills; No headaches  EYES: No visual changes, eye pain, or discharge  ENT: No vertigo; No ear pain or change in hearing; No sore throat or difficulty swallowing  NECK: No pain or stiffness  RESPIRATORY: No cough, wheezing, or hemoptysis; No shortness of breath  CARDIOVASCULAR: No chest pain or palpitations  GASTROINTESTINAL: No abdominal or epigastric pain; No nausea, vomiting, or hematemesis; No diarrhea or constipation; No melena or hematochezia  GENITOURINARY: No dysuria, frequency or hematuria  MUSCULOSKELETAL: No joint pain, no muscle pain, no weakness  NEUROLOGICAL: left eye weakness and right facial paralysis  SKIN: No itching or rashes    OBJECTIVE  PAST MEDICAL & SURGICAL HISTORY  CAD (coronary artery disease)  s/p CABG X3    Hypercholesteremia    Diabetes    HTN (hypertension)    S/P CABG (coronary artery bypass graft)      ALLERGIES:  No Known Allergies    MEDICATIONS:  STANDING MEDICATIONS  ampicillin/sulbactam  IVPB 3 Gram(s) IV Intermittent every 6 hours  artificial  tears Solution 1 Drop(s) Both EYES daily  dexAMETHasone  Injectable 6 milliGRAM(s) IV Push every 8 hours  heparin   Injectable 5000 Unit(s) SubCutaneous every 8 hours  lactated ringers. 1000 milliLiter(s) IV Continuous <Continuous>  LORazepam   Injectable 2 milliGRAM(s) IV Push once  petrolatum Ophthalmic Ointment 1 Application(s) Both EYES at bedtime  valACYclovir 500 milliGRAM(s) Oral daily    PRN MEDICATIONS      VITAL SIGNS: Last 24 Hours  T(C): 36.2 (22 Dec 2020 13:57), Max: 36.8 (21 Dec 2020 21:35)  T(F): 97.2 (22 Dec 2020 13:57), Max: 98.3 (21 Dec 2020 21:35)  HR: 82 (22 Dec 2020 13:57) (80 - 92)  BP: 168/78 (22 Dec 2020 13:57) (133/65 - 168/78)  BP(mean): --  RR: 18 (22 Dec 2020 13:57) (18 - 18)  SpO2: 96% (22 Dec 2020 04:29) (95% - 97%)    LABS:                        12.3   6.72  )-----------( 213      ( 21 Dec 2020 05:35 )             38.2     12-21    138  |  102  |  28<H>  ----------------------------<  163<H>  4.6   |  25  |  1.5    Ca    9.4      21 Dec 2020 05:35  Mg     2.1     12-21    TPro  7.3  /  Alb  4.0  /  TBili  0.9  /  DBili  x   /  AST  23  /  ALT  24  /  AlkPhos  71  12-21    PT/INR - ( 21 Dec 2020 05:35 )   PT: 12.20 sec;   INR: 1.06 ratio         PTT - ( 21 Dec 2020 05:35 )  PTT:33.9 sec      Sedimentation Rate, Erythrocyte: 54 mm/Hr <H> (12-22-20 @ 11:00)      CARDIAC MARKERS ( 20 Dec 2020 20:27 )  x     / <0.01 ng/mL / x     / x     / x          RADIOLOGY:  < from: CT Brain Stroke Protocol (12.20.20 @ 20:32) >  IMPRESSION:    No CT evidence of acute intracranial pathology.  Right mastoid near complete opacification. Please correlate with possible infection or inflammation.  Mild chronic microvascular ischemic changes.  < end of copied text >    < from: CT Angio Head w/ IV Cont (12.20.20 @ 21:56) >  IMPRESSION:    CTA HEAD: No evidence of flow-limiting stenosis, occlusion or aneurysm.  CTA NECK: No evidence of carotid or vertebral artery stenosis.  < end of copied text >    < from: MR IAC No Cont (12.21.20 @ 22:53) >  IMPRESSION:    No acute intracranial pathology.    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 The left oculomotor nerve is noted to be mildly displaced inferiorly by the left PCA and abuts the inferior aspect of the left PCA, this is of uncertain clinical significance but can be seen in a setting of neurovascular compression.    Extensive right mastoid effusions, nonspecific, correlate for otomastoiditis.  < end of copied text >      PHYSICAL EXAM:  CONSTITUTIONAL: No acute distress, well-developed, well-groomed, AAOx3  HEAD: Atraumatic, normocephalic  EYES: EOM intact, PERRLA, conjunctiva and sclera clear  ENT: Supple, no masses, no thyromegaly, no bruits, no JVD; moist mucous membranes  PULMONARY: Clear to auscultation bilaterally; no wheezes, rales, or rhonchi  CARDIOVASCULAR: Regular rate and rhythm; no murmurs, rubs, or gallops  GASTROINTESTINAL: Soft, non-tender, non-distended; bowel sounds present  MUSCULOSKELETAL: 2+ peripheral pulses; no clubbing, no cyanosis, no edema  NEUROLOGY: Left ptosis and right facial paralysis  SKIN: No rashes or lesions; warm and dry    ASSESSMENT & PLAN:  Patient is a 69yo male with PMH of CAD s/p CABG, diabetes mellitus, hypertension, hyperlipidemia, and epistaxis who presented to the ED complaining of right-sided facial droop and left eye droop starting the evening of presentation.    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 No evidence of carotid or vertebral artery stenosis  - MR IAC 12/21/2020: no acute intracranial pathology; extensive right mastoid effusions, correlate for otomastoiditis; The left oculomotor nerve is noted to be mildly displaced inferiorly by the left PCA and abuts the inferior aspect of the left PCA, possibly evident for neurovascular compression  - Neurosurgery consult pending    #COVID-19 pneumonia, asymptomatic  - COVID-19 PCR positive  - Isolation precautions (contact, droplet, airborne)  - Chest X-ray: no acute cardiopulmonary process  - Patient is saturating 96% on room air  - D-Dimer Assay, Quantitative: 148 ng/mL DDU (12-21-20 @ 05:35)  - Follow other inflammatory markers  - Repeat inflammatory markers (D-dimer, CRP, ferritin) Q48-72H if clinically worsening  - ID consult appreciated: no indications for treatment of COVID-19 at this time  - DVT prophylaxis per protocol  - Titrate supplemental O2 to maintain SpO2 92-96%    #Acute kidney injury on CKD stage 3, improving  - Possibly pre-renal vs ATN  - Baseline creatinine: 13 (10/17/2018)  - Creatinine: 2.0->1.5  - Avoid nephrotoxic agents  - Monitor BUN/creatinine   - Continue with LR at 75mL/hr    #CAD s/p CABG x3  - Continue with aspirin 81mg PO once daily, atorvastatin 40mg PO at bedtime, metoprolol tartrate 50mg PO twice daily    #Diabetes mellitus type 2  - Hemoglobin A1c in AM  - Hold home oral medications  - Start insulin glargine 12 units SQ QHS  - Start insulin lispro 5 units SQ TID  - Insulin sliding scale coverage  - Monitor fingerstick glucose, especially as patient will be on IV steroids    #Hypertension  - Continue with metoprolol tartrate 50mg PO twice daily and nifedipine 30mg PO once daily  - Hold home medication losartan-hydrochlorothiazide 100-12.5mg due to MYESHA    #Hyperlipidemia  - Continue with atorvastatin 40mg PO at bedtime  - Follow lipid profile    #Misc  - DVT Prophylaxis: heparin 5000 units SQ Q8H   - GI Prophylaxis: pantoprazole 40mg PO once daily   - Diet: DASH/TLC  - Activity: ambulate as tolerated  - IV Fluids: LR at 75mL/hr  - Code Status: Full Code    Dispo: acute  YAHIR OSPINA 70y Male  MRN#: [id number]   Hospital Day: 2d    SUBJECTIVE  Patient is a 70y old Male who presents with a chief complaint of right sided facial paralysis (22 Dec 2020 11:57)  Currently admitted to medicine with the primary diagnosis of Facial droop      INTERVAL HPI AND OVERNIGHT EVENTS:  Patient was examined and seen at bedside. This morning he is resting comfortably in bed and reports no issues or overnight events. Left ptosis and right facial paralysis are still present.    REVIEW OF SYMPTOMS:  CONSTITUTIONAL: No weakness, fevers or chills; No headaches  EYES: No visual changes, eye pain, or discharge  ENT: No vertigo; No ear pain or change in hearing; No sore throat or difficulty swallowing  NECK: No pain or stiffness  RESPIRATORY: No cough, wheezing, or hemoptysis; No shortness of breath  CARDIOVASCULAR: No chest pain or palpitations  GASTROINTESTINAL: No abdominal or epigastric pain; No nausea, vomiting, or hematemesis; No diarrhea or constipation; No melena or hematochezia  GENITOURINARY: No dysuria, frequency or hematuria  MUSCULOSKELETAL: No joint pain, no muscle pain, no weakness  NEUROLOGICAL: left eye weakness and right facial paralysis  SKIN: No itching or rashes    OBJECTIVE  PAST MEDICAL & SURGICAL HISTORY  CAD (coronary artery disease)  s/p CABG X3    Hypercholesteremia    Diabetes    HTN (hypertension)    S/P CABG (coronary artery bypass graft)      ALLERGIES:  No Known Allergies    MEDICATIONS:  STANDING MEDICATIONS  ampicillin/sulbactam  IVPB 3 Gram(s) IV Intermittent every 6 hours  artificial  tears Solution 1 Drop(s) Both EYES daily  dexAMETHasone  Injectable 6 milliGRAM(s) IV Push every 8 hours  heparin   Injectable 5000 Unit(s) SubCutaneous every 8 hours  lactated ringers. 1000 milliLiter(s) IV Continuous <Continuous>  LORazepam   Injectable 2 milliGRAM(s) IV Push once  petrolatum Ophthalmic Ointment 1 Application(s) Both EYES at bedtime  valACYclovir 500 milliGRAM(s) Oral daily    PRN MEDICATIONS      VITAL SIGNS: Last 24 Hours  T(C): 36.2 (22 Dec 2020 13:57), Max: 36.8 (21 Dec 2020 21:35)  T(F): 97.2 (22 Dec 2020 13:57), Max: 98.3 (21 Dec 2020 21:35)  HR: 82 (22 Dec 2020 13:57) (80 - 92)  BP: 168/78 (22 Dec 2020 13:57) (133/65 - 168/78)  BP(mean): --  RR: 18 (22 Dec 2020 13:57) (18 - 18)  SpO2: 96% (22 Dec 2020 04:29) (95% - 97%)    LABS:                        12.3   6.72  )-----------( 213      ( 21 Dec 2020 05:35 )             38.2     12-21    138  |  102  |  28<H>  ----------------------------<  163<H>  4.6   |  25  |  1.5    Ca    9.4      21 Dec 2020 05:35  Mg     2.1     12-21    TPro  7.3  /  Alb  4.0  /  TBili  0.9  /  DBili  x   /  AST  23  /  ALT  24  /  AlkPhos  71  12-21    PT/INR - ( 21 Dec 2020 05:35 )   PT: 12.20 sec;   INR: 1.06 ratio         PTT - ( 21 Dec 2020 05:35 )  PTT:33.9 sec      Sedimentation Rate, Erythrocyte: 54 mm/Hr <H> (12-22-20 @ 11:00)      CARDIAC MARKERS ( 20 Dec 2020 20:27 )  x     / <0.01 ng/mL / x     / x     / x          RADIOLOGY:  < from: CT Brain Stroke Protocol (12.20.20 @ 20:32) >  IMPRESSION:    No CT evidence of acute intracranial pathology.  Right mastoid near complete opacification. Please correlate with possible infection or inflammation.  Mild chronic microvascular ischemic changes.  < end of copied text >    < from: CT Angio Head w/ IV Cont (12.20.20 @ 21:56) >  IMPRESSION:    CTA HEAD: No evidence of flow-limiting stenosis, occlusion or aneurysm.  CTA NECK: No evidence of carotid or vertebral artery stenosis.  < end of copied text >    < from: MR IAC No Cont (12.21.20 @ 22:53) >  IMPRESSION:    No acute intracranial pathology.    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 The left oculomotor nerve is noted to be mildly displaced inferiorly by the left PCA and abuts the inferior aspect of the left PCA, this is of uncertain clinical significance but can be seen in a setting of neurovascular compression.    Extensive right mastoid effusions, nonspecific, correlate for otomastoiditis.  < end of copied text >      PHYSICAL EXAM:  CONSTITUTIONAL: No acute distress, well-developed, well-groomed, AAOx3  HEAD: Atraumatic, normocephalic  EYES: EOM intact, PERRLA, conjunctiva and sclera clear  ENT: Supple, no masses, no thyromegaly, no bruits, no JVD; moist mucous membranes  PULMONARY: Clear to auscultation bilaterally; no wheezes, rales, or rhonchi  CARDIOVASCULAR: Regular rate and rhythm; no murmurs, rubs, or gallops  GASTROINTESTINAL: Soft, non-tender, non-distended; bowel sounds present  MUSCULOSKELETAL: 2+ peripheral pulses; no clubbing, no cyanosis, no edema  NEUROLOGY: Left ptosis and right facial paralysis  SKIN: No rashes or lesions; warm and dry    ASSESSMENT & PLAN:  Patient is a 69yo male with PMH of CAD s/p CABG, diabetes mellitus, hypertension, hyperlipidemia, and epistaxis who presented to the ED complaining of right-sided facial droop and left eye droop starting the evening of presentation.    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 No evidence of carotid or vertebral artery stenosis  - MR IAC 12/21/2020: no acute intracranial pathology; extensive right mastoid effusions, correlate for otomastoiditis; The left oculomotor nerve is noted to be mildly displaced inferiorly by the left PCA and abuts the inferior aspect of the left PCA, possibly evident for neurovascular compression  - Neurology consult appreciated  - ENT consult appreciated: start Unasyn 3g IV Q6H and dexamethasone 6mg IV Q8H  - Follow CT maxillofacial with IV contrast  - Continue with valacyclovir 500mg PO once daily   - Follow myasthenia gravis antibodies    #Left-sided ptosis, 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 No evidence of carotid or vertebral artery stenosis  - MR IAC 12/21/2020: no acute intracranial pathology; extensive right mastoid effusions, correlate for otomastoiditis; The left oculomotor nerve is noted to be mildly displaced inferiorly by the left PCA and abuts the inferior aspect of the left PCA, possibly evident for neurovascular compression  - Neurosurgery consult pending    #COVID-19 pneumonia, asymptomatic  - COVID-19 PCR positive  - Isolation precautions (contact, droplet, airborne)  - Chest X-ray: no acute cardiopulmonary process  - Patient is saturating 96% on room air  - D-Dimer Assay, Quantitative: 148 ng/mL DDU (12-21-20 @ 05:35)  - Follow other inflammatory markers  - Repeat inflammatory markers (D-dimer, CRP, ferritin) Q48-72H if clinically worsening  - ID consult appreciated: no indications for treatment of COVID-19 at this time  - DVT prophylaxis per protocol  - Titrate supplemental O2 to maintain SpO2 92-96%    #Acute kidney injury on CKD stage 3, improving  - Possibly pre-renal vs ATN  - Baseline creatinine: 13 (10/17/2018)  - Creatinine: 2.0->1.5  - Avoid nephrotoxic agents  - Monitor BUN/creatinine   - Continue with LR at 75mL/hr    #CAD s/p CABG x3  - Continue with aspirin 81mg PO once daily, atorvastatin 40mg PO at bedtime, metoprolol tartrate 50mg PO twice daily    #Diabetes mellitus type 2  - Hemoglobin A1c in AM  - Hold home oral medications  - Start insulin glargine 12 units SQ QHS  - Start insulin lispro 5 units SQ TID  - Insulin sliding scale coverage  - Monitor fingerstick glucose, especially as patient will be on IV steroids    #Hypertension  - Continue with metoprolol tartrate 50mg PO twice daily and nifedipine 30mg PO once daily  - Hold home medication losartan-hydrochlorothiazide 100-12.5mg due to MYESHA    #Hyperlipidemia  - Continue with atorvastatin 40mg PO at bedtime  - Follow lipid profile    #Misc  - DVT Prophylaxis: heparin 5000 units SQ Q8H   - GI Prophylaxis: pantoprazole 40mg PO once daily   - Diet: DASH/TLC  - Activity: ambulate as tolerated  - IV Fluids: LR at 75mL/hr  - Code Status: Full Code    Dispo: acute

## 2020-12-22 NOTE — PROGRESS NOTE ADULT - ASSESSMENT
Pt is a 71 yo M a/w R sided facial paralysis; CT with finding of R mastoid opacification.    ·	Cont Unasyn   ·	s/p decadron 10mg x 1 dose; cont decadron 6mg q 8hr   ·	Cont Ciprodex gtt q 12hr to R ear  ·	f/u CT Temporal bone with contrast   ·	Cont with patch and drops   ·	Will d/w attng  Pt is a 69 yo M a/w R sided facial palsy; CT with finding of R mastoid opacification.    ·	Cont Unasyn   ·	s/p decadron 10mg x 1 dose; cont decadron 6mg q 8hr   ·	Cont Ciprodex gtt q 12hr to R ear  ·	f/u CT Temporal bone with contrast   ·	Cont with patch and drops   ·	Will d/w attng

## 2020-12-22 NOTE — PROGRESS NOTE ADULT - ASSESSMENT
69 YO RHM with pmhx of dm .htn, cad cabg , chicken pox as a child p.w right sided facial droop which he noticed an hour prior to presentation. MRI brain is negative for acute stroke.    Is being treated for otomastoiditis and bells palsy.      Plan:  Continue antivirals and steroids for 10 days  F/u on MG antibodies  Outpatient neurology f/u in 2-3 weeks        Plan discussed with Dr. Alonso

## 2020-12-22 NOTE — PROGRESS NOTE ADULT - SUBJECTIVE AND OBJECTIVE BOX
ENT DAILY PROGRESS NOTE    Pt is a 70y M a/w R sided facial paralysis. Pt seen and examined at bedside this am. Pt reports pain to R ear has improved since yesterday. Denies fevers, chills.     REVIEW OF SYSTEMS   [x] A ten-point review of systems was otherwise negative except as noted.  [ ] Due to altered mental status/intubation, subjective information were not able to be obtained from patient. History was obtained, to the extent possible, from review of the chart and collateral sources of information.    Allergies  No Known Allergies    MEDICATIONS:  ampicillin/sulbactam  IVPB 3 Gram(s) IV Intermittent every 6 hours  artificial  tears Solution 1 Drop(s) Both EYES daily  heparin   Injectable 5000 Unit(s) SubCutaneous every 8 hours  lactated ringers. 1000 milliLiter(s) IV Continuous <Continuous>  LORazepam   Injectable 2 milliGRAM(s) IV Push once  petrolatum Ophthalmic Ointment 1 Application(s) Both EYES at bedtime  valACYclovir Oral Liquid - Peds 500 milliGRAM(s) Oral daily    Vital Signs Last 24 Hrs  T(C): 36.8 (22 Dec 2020 04:29), Max: 36.8 (21 Dec 2020 21:35)  T(F): 98.2 (22 Dec 2020 04:29), Max: 98.3 (21 Dec 2020 21:35)  HR: 80 (22 Dec 2020 04:29) (79 - 92)  BP: 133/65 (22 Dec 2020 04:29) (133/65 - 185/86)  RR: 18 (22 Dec 2020 04:29) (18 - 18)  SpO2: 96% (22 Dec 2020 04:29) (95% - 97%)      12-21 @ 07:01  -  12-22 @ 07:00  --------------------------------------------------------  IN:    Lactated Ringers: 825 mL    Oral Fluid: 240 mL  Total IN: 1065 mL    OUT:    Voided (mL): 2400 mL  Total OUT: 2400 mL    Total NET: -1335 mL      12-22 @ 07:01  -  12-22 @ 10:16  --------------------------------------------------------  IN:    IV PiggyBack: 100 mL    Oral Fluid: 210 mL  Total IN: 310 mL    OUT:  Total OUT: 0 mL    Total NET: 310 mL    PHYSICAL EXAM:  GEN: NAD, awake and alert   HEAD: NC/AT  ENT: +mild ttp to R mastoid, no erythema or edema noted to overlying skin, no pre-auricular ttp, +purulent drainage to R EAC, decreased from previous exam, unable to visulazie R TM; L EAC without purulent drainage, L TM intact, no L pre/post auricular tp; +R sided facial paralysis   RESP: Non-labored breathing  CARDIO: +S1/S2  ABDO: Soft, NT  EXT: HAZEL x 4    LABS:  CBC-                        12.3   6.72  )-----------( 213      ( 21 Dec 2020 05:35 )             38.2     BMP/CMP-  21 Dec 2020 05:35    138    |  102    |  28     ----------------------------<  163    4.6     |  25     |  1.5      Ca    9.4        21 Dec 2020 05:35  Mg     2.1       21 Dec 2020 05:35    TPro  7.3    /  Alb  4.0    /  TBili  0.9    /  DBili  x      /  AST  23     /  ALT  24     /  AlkPhos  71     21 Dec 2020 05:35    Coagulation Studies-  PT/INR - ( 21 Dec 2020 05:35 )   PT: 12.20 sec;   INR: 1.06 ratio       PTT - ( 21 Dec 2020 05:35 )  PTT:33.9 sec  Endocrine Panel-    RADIOLOGY & ADDITIONAL STUDIES:  `< from: MR IAC No Cont (12.21.20 @ 22:53) >  EXAM:  MR IAC ONLY        EXAM:  MR BRAIN          PROCEDURE DATE:  12/21/2020      INTERPRETATION:  CLINICAL INDICATION: Left eye ptosis    TECHNIQUE: Multi-planar multi-sequential MR imaging of the brain with special attention to the internal auditory canals was performed without intravenous  contrast.    COMPARISON: None available    FINDINGS:    There is prominence of the sulci, sylvian fissures, and ventricles, reflecting age-related mild diffuse parenchymal volume loss.    Evaluation of the internal auditory canals and cerebellopontine angle cisterns reveals no mass and no abnormal enhancement. The cisternal and canalicular portions of the seventh and eighth cranial nerves are normal. The bilateral vestibulocochlear complexes appear intact.    No acute infarction, intracranial hemorrhage or intraparenchymal mass lesion. No abnormal intraparenchymal enhancement is identified.    No hydrocephalus. No extra-axial fluid collections.    There is mild dolichoectasia of the vertebrobasilar artery. The left oculomotor nerve is noted to be mildly displaced inferiorly by the left PCA, and abuts the inferior aspect of the left PCA. The right oculomotor nerve abuts the dolichoectatic basilar artery. Otherwise the skull base flow voids are present.    The visualized intraorbital contents are normal. There is mild mucosal thickening in scattered portions of the visualized paranasal sinuses.  There is extensive opacification of the right mastoid air cells. The other visualized soft tissues and osseous structures appear normal.      IMPRESSION:    No acute intracranial pathology.    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 The left oculomotor nerve is noted to be mildly displaced inferiorly by the left PCA and abuts the inferior aspect of the left PCA, this is of uncertain clinical significance but can be seen in a setting of neurovascular compression.    Extensive right mastoid effusions, nonspecific, correlate for otomastoiditis.      ALEXANDER SHARIF M.D., ATTENDING RADIOLOGIST  This document has been electronically signed. Dec 22 2020  9:52AM    < end of copied text >     ENT DAILY PROGRESS NOTE    Pt is a 70y M a/w R sided facial palsy. Pt seen and examined at bedside this am. Pt reports pain to R ear has improved since yesterday. Denies fevers, chills.     REVIEW OF SYSTEMS   [x] A ten-point review of systems was otherwise negative except as noted.  [ ] Due to altered mental status/intubation, subjective information were not able to be obtained from patient. History was obtained, to the extent possible, from review of the chart and collateral sources of information.    Allergies  No Known Allergies    MEDICATIONS:  ampicillin/sulbactam  IVPB 3 Gram(s) IV Intermittent every 6 hours  artificial  tears Solution 1 Drop(s) Both EYES daily  heparin   Injectable 5000 Unit(s) SubCutaneous every 8 hours  lactated ringers. 1000 milliLiter(s) IV Continuous <Continuous>  LORazepam   Injectable 2 milliGRAM(s) IV Push once  petrolatum Ophthalmic Ointment 1 Application(s) Both EYES at bedtime  valACYclovir Oral Liquid - Peds 500 milliGRAM(s) Oral daily    Vital Signs Last 24 Hrs  T(C): 36.8 (22 Dec 2020 04:29), Max: 36.8 (21 Dec 2020 21:35)  T(F): 98.2 (22 Dec 2020 04:29), Max: 98.3 (21 Dec 2020 21:35)  HR: 80 (22 Dec 2020 04:29) (79 - 92)  BP: 133/65 (22 Dec 2020 04:29) (133/65 - 185/86)  RR: 18 (22 Dec 2020 04:29) (18 - 18)  SpO2: 96% (22 Dec 2020 04:29) (95% - 97%)      12-21 @ 07:01  -  12-22 @ 07:00  --------------------------------------------------------  IN:    Lactated Ringers: 825 mL    Oral Fluid: 240 mL  Total IN: 1065 mL    OUT:    Voided (mL): 2400 mL  Total OUT: 2400 mL    Total NET: -1335 mL      12-22 @ 07:01  -  12-22 @ 10:16  --------------------------------------------------------  IN:    IV PiggyBack: 100 mL    Oral Fluid: 210 mL  Total IN: 310 mL    OUT:  Total OUT: 0 mL    Total NET: 310 mL    PHYSICAL EXAM:  GEN: NAD, awake and alert   HEAD: NC/AT  ENT: +mild ttp to R mastoid, no erythema or edema noted to overlying skin, no pre-auricular ttp, +purulent drainage to R EAC, decreased from previous exam, unable to visulazie R TM; L EAC without purulent drainage, L TM intact, no L pre/post auricular tp; +R sided facial palsy   RESP: Non-labored breathing  CARDIO: +S1/S2  ABDO: Soft, NT  EXT: HAZEL x 4    LABS:  CBC-                        12.3   6.72  )-----------( 213      ( 21 Dec 2020 05:35 )             38.2     BMP/CMP-  21 Dec 2020 05:35    138    |  102    |  28     ----------------------------<  163    4.6     |  25     |  1.5      Ca    9.4        21 Dec 2020 05:35  Mg     2.1       21 Dec 2020 05:35    TPro  7.3    /  Alb  4.0    /  TBili  0.9    /  DBili  x      /  AST  23     /  ALT  24     /  AlkPhos  71     21 Dec 2020 05:35    Coagulation Studies-  PT/INR - ( 21 Dec 2020 05:35 )   PT: 12.20 sec;   INR: 1.06 ratio       PTT - ( 21 Dec 2020 05:35 )  PTT:33.9 sec  Endocrine Panel-    RADIOLOGY & ADDITIONAL STUDIES:  `< from: MR IAC No Cont (12.21.20 @ 22:53) >  EXAM:  MR IAC ONLY        EXAM:  MR BRAIN          PROCEDURE DATE:  12/21/2020      INTERPRETATION:  CLINICAL INDICATION: Left eye ptosis    TECHNIQUE: Multi-planar multi-sequential MR imaging of the brain with special attention to the internal auditory canals was performed without intravenous  contrast.    COMPARISON: None available    FINDINGS:    There is prominence of the sulci, sylvian fissures, and ventricles, reflecting age-related mild diffuse parenchymal volume loss.    Evaluation of the internal auditory canals and cerebellopontine angle cisterns reveals no mass and no abnormal enhancement. The cisternal and canalicular portions of the seventh and eighth cranial nerves are normal. The bilateral vestibulocochlear complexes appear intact.    No acute infarction, intracranial hemorrhage or intraparenchymal mass lesion. No abnormal intraparenchymal enhancement is identified.    No hydrocephalus. No extra-axial fluid collections.    There is mild dolichoectasia of the vertebrobasilar artery. The left oculomotor nerve is noted to be mildly displaced inferiorly by the left PCA, and abuts the inferior aspect of the left PCA. The right oculomotor nerve abuts the dolichoectatic basilar artery. Otherwise the skull base flow voids are present.    The visualized intraorbital contents are normal. There is mild mucosal thickening in scattered portions of the visualized paranasal sinuses.  There is extensive opacification of the right mastoid air cells. The other visualized soft tissues and osseous structures appear normal.      IMPRESSION:    No acute intracranial pathology.    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 The left oculomotor nerve is noted to be mildly displaced inferiorly by the left PCA and abuts the inferior aspect of the left PCA, this is of uncertain clinical significance but can be seen in a setting of neurovascular compression.    Extensive right mastoid effusions, nonspecific, correlate for otomastoiditis.      ALEXANDER SHARIF M.D., ATTENDING RADIOLOGIST  This document has been electronically signed. Dec 22 2020  9:52AM    < end of copied text >

## 2020-12-22 NOTE — CONSULT NOTE ADULT - CONSULT REASON
Mastoiditis
mastoiditis
Possible Oculomotor Nerve Compression
Stroke code 2013hrs (prenotification)  Patient arrived in ED at: 2019hrs

## 2020-12-22 NOTE — CONSULT NOTE ADULT - ATTENDING COMMENTS
At this time there is no indication for neurosurgical intervention.  For the question of vascular anomalies, would consult neurology and neuro IR.
Patient examined this morning and was able to follow commands, no weakness or ataxia or sensory loss in extremities.  Right peripheral 7th palsy and left eyelid droop present.  Covid +.  Multiple cranial nerve palsy in Covid + patient.   Agree with MRI brain with/without contrast.  Also may check inflammatory markers:  IL-1b, IL-2, IL-6, IL-10, IL-17, TNF-alpha, IFN-Gamma, IP-10 in addition to ferritin, CRP, absolute lymphocyte counts.

## 2020-12-22 NOTE — CONSULT NOTE ADULT - SUBJECTIVE AND OBJECTIVE BOX
HPI:  69 yo M with pmhx of CAD s/p CABG, DM, HTN, epistaxis presented to the ED with Right sided facial droop and Left eye Ptosis this evening. Patient states that he noticed a left eyelid droop since yesterday. His wife noticed left facial droop and mild slurring of speech, while eating dinner yesterday, and activated 911. He denies any preceding events or previously similar episodes. Patient also states that his right hear has been hurting for the past week with increased discharge, and tenderness with palpation. He otherwise currently admits to some blurriness in his left eye (wears glasses at baseline and light sensitivity.  Of note, patient admits to having chicken pox when younger.  to the ED, Code stroke was activated, with NIHSS score of 2. Patient currently denies any chest pain, other focal deficits, abdominal discomfort, headaches, shortness of breath, and remains with right sided facial paralysis.     ED course: Vitals stable on admission. Stroke code activated, patient out of TPA window. CTH negative for stroke, CTA with no large vessel occlusion, but has Right mastoid complete opacification, with suspected inflammation. COVID 19 + in ED, CXR pending. No leukocytosis, with elevated creatinine (21 Dec 2020 00:10)        PAST MEDICAL & SURGICAL HISTORY:  CAD (coronary artery disease)  s/p CABG X3    Hypercholesteremia    Diabetes    HTN (hypertension)    S/P CABG (coronary artery bypass graft)        Home Medications:  Aspir 81 oral delayed release tablet: 1 tab(s) orally once a day (21 Dec 2020 02:10)  glimepiride 1 mg oral tablet: 1 tab(s) orally once a day (21 Dec 2020 02:10)  losartan-hydrochlorothiazide 100mg-12.5mg oral tablet: 1 tab(s) orally once a day (21 Dec 2020 02:10)  metFORMIN 750 mg oral tablet, extended release: 1 tab(s) orally once a day (21 Dec 2020 02:10)  metoprolol tartrate 50 mg oral tablet: 1 tab(s) orally 2 times a day (21 Dec 2020 02:10)  NIFEdipine 30 mg oral tablet, extended release: 1 tab(s) orally once a day (21 Dec 2020 02:10)  rosuvastatin 10 mg oral capsule: 1 cap(s) orally once a day (21 Dec 2020 02:10)      Allergies    No Known Allergies    Intolerances    ROS:  [X] A ten-point review of systems is negative except as noted   [  ] Due to altered mental status/intubation, subjective information were not able to be obtained from the patient. History was obtained, to the extent possible, from review of the chart and collateral sources of information    MEDICATIONS  (STANDING):  ampicillin/sulbactam  IVPB 3 Gram(s) IV Intermittent every 6 hours  artificial  tears Solution 1 Drop(s) Both EYES daily  heparin   Injectable 5000 Unit(s) SubCutaneous every 8 hours  lactated ringers. 1000 milliLiter(s) (75 mL/Hr) IV Continuous <Continuous>  LORazepam   Injectable 2 milliGRAM(s) IV Push once  petrolatum Ophthalmic Ointment 1 Application(s) Both EYES at bedtime  valACYclovir Oral Liquid - Peds 500 milliGRAM(s) Oral daily    MEDICATIONS  (PRN):      ICU Vital Signs Last 24 Hrs  T(C): 36.8 (22 Dec 2020 04:29), Max: 36.8 (21 Dec 2020 21:35)  T(F): 98.2 (22 Dec 2020 04:29), Max: 98.3 (21 Dec 2020 21:35)  HR: 80 (22 Dec 2020 04:29) (79 - 92)  BP: 133/65 (22 Dec 2020 04:29) (133/65 - 185/86)  BP(mean): --  ABP: --  ABP(mean): --  RR: 18 (22 Dec 2020 04:29) (18 - 18)  SpO2: 96% (22 Dec 2020 04:29) (95% - 97%)      I&O's Detail    21 Dec 2020 07:01  -  22 Dec 2020 07:00  --------------------------------------------------------  IN:    Lactated Ringers: 825 mL    Oral Fluid: 240 mL  Total IN: 1065 mL    OUT:    Voided (mL): 2400 mL  Total OUT: 2400 mL    Total NET: -1335 mL      22 Dec 2020 07:01  -  22 Dec 2020 11:58  --------------------------------------------------------  IN:    IV PiggyBack: 100 mL    Oral Fluid: 210 mL  Total IN: 310 mL    OUT:  Total OUT: 0 mL    Total NET: 310 mL    CBC Full  -  ( 21 Dec 2020 05:35 )  WBC Count : 6.72 K/uL  RBC Count : 4.56 M/uL  Hemoglobin : 12.3 g/dL  Hematocrit : 38.2 %  Platelet Count - Automated : 213 K/uL  Mean Cell Volume : 83.8 fL  Mean Cell Hemoglobin : 27.0 pg  Mean Cell Hemoglobin Concentration : 32.2 g/dL  Auto Neutrophil # : 4.65 K/uL  Auto Lymphocyte # : 1.04 K/uL  Auto Monocyte # : 0.65 K/uL  Auto Eosinophil # : 0.32 K/uL  Auto Basophil # : 0.03 K/uL  Auto Neutrophil % : 69.2 %  Auto Lymphocyte % : 15.5 %  Auto Monocyte % : 9.7 %  Auto Eosinophil % : 4.8 %  Auto Basophil % : 0.4 %    12-21    138  |  102  |  28<H>  ----------------------------<  163<H>  4.6   |  25  |  1.5    Ca    9.4      21 Dec 2020 05:35  Mg     2.1     12-21    TPro  7.3  /  Alb  4.0  /  TBili  0.9  /  DBili  x   /  AST  23  /  ALT  24  /  AlkPhos  71  12-21    CARDIAC MARKERS ( 20 Dec 2020 20:27 )  x     / <0.01 ng/mL / x     / x     / x          AAOX3. 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 The left oculomotor nerve is noted to be mildly displaced inferiorly by the left PCA and abuts the inferior aspect of the left PCA, this is of uncertain clinical significance but can be seen in a setting of neurovascular compression.    < from: MR IAC No Cont (12.21.20 @ 22:53) >  Extensive right mastoid effusions, nonspecific, correlate for otomastoiditis.    < end of copied text >      Assessment  As above    Plan  Will review films with Attending HPI:  71 yo M with pmhx of CAD s/p CABG, DM, HTN, epistaxis presented to the ED with Right sided facial droop and Left eye Ptosis this evening. Patient states that he noticed a left eyelid droop since yesterday. His wife noticed left facial droop and mild slurring of speech, while eating dinner yesterday, and activated 911. He denies any preceding events or previously similar episodes. Patient also states that his right hear has been hurting for the past week with increased discharge, and tenderness with palpation. He otherwise currently admits to some blurriness in his left eye (wears glasses at baseline and light sensitivity.  Of note, patient admits to having chicken pox when younger.  to the ED, Code stroke was activated, with NIHSS score of 2. Patient currently denies any chest pain, other focal deficits, abdominal discomfort, headaches, shortness of breath, and remains with right sided facial paralysis.     ED course: Vitals stable on admission. Stroke code activated, patient out of TPA window. CTH negative for stroke, CTA with no large vessel occlusion, but has Right mastoid complete opacification, with suspected inflammation. COVID 19 + in ED, CXR pending. No leukocytosis, with elevated creatinine (21 Dec 2020 00:10)        PAST MEDICAL & SURGICAL HISTORY:  CAD (coronary artery disease)  s/p CABG X3    Hypercholesteremia    Diabetes    HTN (hypertension)    S/P CABG (coronary artery bypass graft)        Home Medications:  Aspir 81 oral delayed release tablet: 1 tab(s) orally once a day (21 Dec 2020 02:10)  glimepiride 1 mg oral tablet: 1 tab(s) orally once a day (21 Dec 2020 02:10)  losartan-hydrochlorothiazide 100mg-12.5mg oral tablet: 1 tab(s) orally once a day (21 Dec 2020 02:10)  metFORMIN 750 mg oral tablet, extended release: 1 tab(s) orally once a day (21 Dec 2020 02:10)  metoprolol tartrate 50 mg oral tablet: 1 tab(s) orally 2 times a day (21 Dec 2020 02:10)  NIFEdipine 30 mg oral tablet, extended release: 1 tab(s) orally once a day (21 Dec 2020 02:10)  rosuvastatin 10 mg oral capsule: 1 cap(s) orally once a day (21 Dec 2020 02:10)      Allergies    No Known Allergies    Intolerances    ROS:  [X] A ten-point review of systems is negative except as noted   [  ] Due to altered mental status/intubation, subjective information were not able to be obtained from the patient. History was obtained, to the extent possible, from review of the chart and collateral sources of information    MEDICATIONS  (STANDING):  ampicillin/sulbactam  IVPB 3 Gram(s) IV Intermittent every 6 hours  artificial  tears Solution 1 Drop(s) Both EYES daily  heparin   Injectable 5000 Unit(s) SubCutaneous every 8 hours  lactated ringers. 1000 milliLiter(s) (75 mL/Hr) IV Continuous <Continuous>  LORazepam   Injectable 2 milliGRAM(s) IV Push once  petrolatum Ophthalmic Ointment 1 Application(s) Both EYES at bedtime  valACYclovir Oral Liquid - Peds 500 milliGRAM(s) Oral daily    MEDICATIONS  (PRN):      ICU Vital Signs Last 24 Hrs  T(C): 36.8 (22 Dec 2020 04:29), Max: 36.8 (21 Dec 2020 21:35)  T(F): 98.2 (22 Dec 2020 04:29), Max: 98.3 (21 Dec 2020 21:35)  HR: 80 (22 Dec 2020 04:29) (79 - 92)  BP: 133/65 (22 Dec 2020 04:29) (133/65 - 185/86)  BP(mean): --  ABP: --  ABP(mean): --  RR: 18 (22 Dec 2020 04:29) (18 - 18)  SpO2: 96% (22 Dec 2020 04:29) (95% - 97%)      I&O's Detail    21 Dec 2020 07:01  -  22 Dec 2020 07:00  --------------------------------------------------------  IN:    Lactated Ringers: 825 mL    Oral Fluid: 240 mL  Total IN: 1065 mL    OUT:    Voided (mL): 2400 mL  Total OUT: 2400 mL    Total NET: -1335 mL      22 Dec 2020 07:01  -  22 Dec 2020 11:58  --------------------------------------------------------  IN:    IV PiggyBack: 100 mL    Oral Fluid: 210 mL  Total IN: 310 mL    OUT:  Total OUT: 0 mL    Total NET: 310 mL    CBC Full  -  ( 21 Dec 2020 05:35 )  WBC Count : 6.72 K/uL  RBC Count : 4.56 M/uL  Hemoglobin : 12.3 g/dL  Hematocrit : 38.2 %  Platelet Count - Automated : 213 K/uL  Mean Cell Volume : 83.8 fL  Mean Cell Hemoglobin : 27.0 pg  Mean Cell Hemoglobin Concentration : 32.2 g/dL  Auto Neutrophil # : 4.65 K/uL  Auto Lymphocyte # : 1.04 K/uL  Auto Monocyte # : 0.65 K/uL  Auto Eosinophil # : 0.32 K/uL  Auto Basophil # : 0.03 K/uL  Auto Neutrophil % : 69.2 %  Auto Lymphocyte % : 15.5 %  Auto Monocyte % : 9.7 %  Auto Eosinophil % : 4.8 %  Auto Basophil % : 0.4 %    12-21    138  |  102  |  28<H>  ----------------------------<  163<H>  4.6   |  25  |  1.5    Ca    9.4      21 Dec 2020 05:35  Mg     2.1     12-21    TPro  7.3  /  Alb  4.0  /  TBili  0.9  /  DBili  x   /  AST  23  /  ALT  24  /  AlkPhos  71  12-21    CARDIAC MARKERS ( 20 Dec 2020 20:27 )  x     / <0.01 ng/mL / x     / x     / x          A&Ox3 with clear speech  PERRL  EOMI  + Left Ptosis  + Right facial droop  Tongue midline  Trigen sens intact  No drift  Finger to nose intact  HAZEL - good strength  Follows complex commands      Imaging:  < from: MR IAC No Cont (12.21.20 @ 22:53) >  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 The left oculomotor nerve is noted to be mildly displaced inferiorly by the left PCA and abuts the inferior aspect of the left PCA, this is of uncertain clinical significance but can be seen in a setting of neurovascular compression.    < from: MR IAC No Cont (12.21.20 @ 22:53) >  Extensive right mastoid effusions, nonspecific, correlate for otomastoiditis.    < end of copied text >      Assessment  As above    Plan  Will review films with Attending

## 2020-12-22 NOTE — PROGRESS NOTE ADULT - SUBJECTIVE AND OBJECTIVE BOX
Neurology Progress Note    Interval History:  patient is examined at the bedside, he continues to have right facial paralysis. He is on IV ABx for otomastoiditis.  MRI brain is negative for acute stroke, shows mild displacement of left oculomotor nerve.    HPI:  71 yo M with pmhx of CAD s/p CABG, DM, HTN, epistaxis presented to the ED with Right sided facial droop and Left eye Ptosis this evening. Patient states that he noticed a left eyelid droop since yesterday. His wife noticed left facial droop and mild slurring of speech, while eating dinner yesterday, and activated 911. He denies any preceding events or previously similar episodes. Patient also states that his right hear has been hurting for the past week with increased discharge, and tenderness with palpation. He otherwise currently admits to some blurriness in his left eye (wears glasses at baseline and light sensitivity.  Of note, patient admits to having chicken pox when younger.  to the ED, Code stroke was activated, with NIHSS score of 2. Patient currently denies any chest pain, other focal deficits, abdominal discomfort, headaches, shortness of breath, and remains with right sided facial paralysis.     ED course: Vitals stable on admission. Stroke code activated, patient out of TPA window. CTH negative for stroke, CTA with no large vessel occlusion, but has Right mastoid complete opacification, with suspected inflammation. COVID 19 + in ED, CXR pending. No leukocytosis, with elevated creatinine (21 Dec 2020 00:10)      PAST MEDICAL & SURGICAL HISTORY:  CAD (coronary artery disease)  s/p CABG X3    Hypercholesteremia    Diabetes    HTN (hypertension)    S/P CABG (coronary artery bypass graft)    < from: MR Head No Cont (12.21.20 @ 22:53) >    EXAM:  MR IAC ONLY        EXAM:  MR BRAIN            PROCEDURE DATE:  12/21/2020            INTERPRETATION:  CLINICAL INDICATION: Left eye ptosis    TECHNIQUE: Multi-planar multi-sequential MR imaging of the brain with special attention to the internal auditory canals was performed without intravenous  contrast.    COMPARISON: None available    FINDINGS:    There is prominence of the sulci, sylvian fissures, and ventricles, reflecting age-related mild diffuse parenchymal volume loss.    Evaluation of the internal auditory canals and cerebellopontine angle cisterns reveals no mass and no abnormal enhancement. The cisternal and canalicular portions of the seventh and eighth cranial nerves are normal. The bilateral vestibulocochlear complexes appear intact.    No acute infarction, intracranial hemorrhage or intraparenchymal mass lesion. No abnormal intraparenchymal enhancement is identified.    No hydrocephalus. No extra-axial fluid collections.    There is mild dolichoectasia of the vertebrobasilar artery. The left oculomotor nerve is noted to be mildly displaced inferiorly by the left PCA, and abuts the inferior aspect of the left PCA. The right oculomotor nerve abuts the dolichoectatic basilar artery. Otherwise the skull base flow voids are present.    The visualized intraorbital contents are normal. There is mild mucosal thickening in scattered portions of the visualized paranasal sinuses.  There is extensive opacification of the right mastoid air cells. The other visualized soft tissues and osseous structures appear normal.      IMPRESSION:    No acute intracranial pathology.    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 The left oculomotor nerve is noted to be mildly displaced inferiorly by the left PCA and abuts the inferior aspect of the left PCA, this is of uncertain clinical significance but can be seen in a setting of neurovascular compression.    Extensive right mastoid effusions, nonspecific, correlate for otomastoiditis.        < end of copied text >          Medications:  ampicillin/sulbactam  IVPB 3 Gram(s) IV Intermittent every 6 hours  artificial  tears Solution 1 Drop(s) Both EYES daily  aspirin enteric coated 81 milliGRAM(s) Oral daily  atorvastatin 40 milliGRAM(s) Oral at bedtime  dexAMETHasone  Injectable 6 milliGRAM(s) IV Push every 8 hours  heparin   Injectable 5000 Unit(s) SubCutaneous every 8 hours  insulin glargine Injectable (LANTUS) 12 Unit(s) SubCutaneous at bedtime  insulin lispro (ADMELOG) corrective regimen sliding scale   SubCutaneous three times a day before meals  insulin lispro Injectable (ADMELOG) 5 Unit(s) SubCutaneous three times a day before meals  lactated ringers. 1000 milliLiter(s) IV Continuous <Continuous>  LORazepam   Injectable 2 milliGRAM(s) IV Push once  metoprolol tartrate 50 milliGRAM(s) Oral two times a day  NIFEdipine XL 30 milliGRAM(s) Oral daily  pantoprazole    Tablet 40 milliGRAM(s) Oral before breakfast  petrolatum Ophthalmic Ointment 1 Application(s) Both EYES at bedtime  valACYclovir 500 milliGRAM(s) Oral daily      Vital Signs Last 24 Hrs  T(C): 36.2 (22 Dec 2020 13:57), Max: 36.8 (21 Dec 2020 21:35)  T(F): 97.2 (22 Dec 2020 13:57), Max: 98.3 (21 Dec 2020 21:35)  HR: 82 (22 Dec 2020 13:57) (80 - 92)  BP: 168/78 (22 Dec 2020 13:57) (133/65 - 168/78)  BP(mean): --  RR: 18 (22 Dec 2020 13:57) (18 - 18)  SpO2: 96% (22 Dec 2020 04:29) (95% - 97%)    Neurological Exam:   Mental status: Awake, alert and oriented x3.  Recent and remote memory intact.  Naming, repetition and comprehension intact.  Attention/concentration intact.  No dysarthria, no aphasia.  Fund of knowledge appropriate.    Cranial nerves: Pupils equally round and reactive to light, visual fields full, no nystagmus, extraocular muscles intact, V1 through V3 intact bilaterally and symmetric, R facial palsy involving upper and lower face, also left upper   Motor:  MRC grading 5/5 b/l UE/LE.   strength 5/5.  Normal tone and bulk.  No abnormal movements.    Sensation: Intact to light touch, proprioception, and pinprick.   Coordination: No dysmetria on finger-to-nose and heel-to-shin.  No dysdiadokinesia.  Reflexes: 2+ in bilateral UE/LE, downgoing toes bilaterally. (-) Metzger.      Labs:  CBC Full  -  ( 21 Dec 2020 05:35 )  WBC Count : 6.72 K/uL  RBC Count : 4.56 M/uL  Hemoglobin : 12.3 g/dL  Hematocrit : 38.2 %  Platelet Count - Automated : 213 K/uL  Mean Cell Volume : 83.8 fL  Mean Cell Hemoglobin : 27.0 pg  Mean Cell Hemoglobin Concentration : 32.2 g/dL  Auto Neutrophil # : 4.65 K/uL  Auto Lymphocyte # : 1.04 K/uL  Auto Monocyte # : 0.65 K/uL  Auto Eosinophil # : 0.32 K/uL  Auto Basophil # : 0.03 K/uL  Auto Neutrophil % : 69.2 %  Auto Lymphocyte % : 15.5 %  Auto Monocyte % : 9.7 %  Auto Eosinophil % : 4.8 %  Auto Basophil % : 0.4 %    12-21    138  |  102  |  28<H>  ----------------------------<  163<H>  4.6   |  25  |  1.5    Ca    9.4      21 Dec 2020 05:35  Mg     2.1     12-21    TPro  7.3  /  Alb  4.0  /  TBili  0.9  /  DBili  x   /  AST  23  /  ALT  24  /  AlkPhos  71  12-21    LIVER FUNCTIONS - ( 21 Dec 2020 05:35 )  Alb: 4.0 g/dL / Pro: 7.3 g/dL / ALK PHOS: 71 U/L / ALT: 24 U/L / AST: 23 U/L / GGT: x           PT/INR - ( 21 Dec 2020 05:35 )   PT: 12.20 sec;   INR: 1.06 ratio         PTT - ( 21 Dec 2020 05:35 )  PTT:33.9 sec      Assessment:  This is a 70y Male w/ h/o     Plan:

## 2020-12-23 LAB
-  AMPICILLIN/SULBACTAM: SIGNIFICANT CHANGE UP
-  CEFAZOLIN: SIGNIFICANT CHANGE UP
-  CLINDAMYCIN: SIGNIFICANT CHANGE UP
-  ERYTHROMYCIN: SIGNIFICANT CHANGE UP
-  GENTAMICIN: SIGNIFICANT CHANGE UP
-  OXACILLIN: SIGNIFICANT CHANGE UP
-  PENICILLIN: SIGNIFICANT CHANGE UP
-  RIFAMPIN: SIGNIFICANT CHANGE UP
-  TETRACYCLINE: SIGNIFICANT CHANGE UP
-  TRIMETHOPRIM/SULFAMETHOXAZOLE: SIGNIFICANT CHANGE UP
-  VANCOMYCIN: SIGNIFICANT CHANGE UP
A1C WITH ESTIMATED AVERAGE GLUCOSE RESULT: 7.6 % — HIGH (ref 4–5.6)
ACE SERPL-CCNC: 42 U/L — SIGNIFICANT CHANGE UP (ref 14–82)
ANA TITR SER: NEGATIVE — SIGNIFICANT CHANGE UP
ANION GAP SERPL CALC-SCNC: 12 MMOL/L — SIGNIFICANT CHANGE UP (ref 7–14)
B BURGDOR C6 AB SER-ACNC: NEGATIVE — SIGNIFICANT CHANGE UP
B BURGDOR IGG+IGM SER-ACNC: 0.6 INDEX — SIGNIFICANT CHANGE UP (ref 0.01–0.89)
BASOPHILS # BLD AUTO: 0.01 K/UL — SIGNIFICANT CHANGE UP (ref 0–0.2)
BASOPHILS NFR BLD AUTO: 0.1 % — SIGNIFICANT CHANGE UP (ref 0–1)
BUN SERPL-MCNC: 23 MG/DL — HIGH (ref 10–20)
CALCIUM SERPL-MCNC: 9.3 MG/DL — SIGNIFICANT CHANGE UP (ref 8.5–10.1)
CHLORIDE SERPL-SCNC: 101 MMOL/L — SIGNIFICANT CHANGE UP (ref 98–110)
CHOLEST SERPL-MCNC: 149 MG/DL — SIGNIFICANT CHANGE UP
CO2 SERPL-SCNC: 24 MMOL/L — SIGNIFICANT CHANGE UP (ref 17–32)
CREAT SERPL-MCNC: 1.2 MG/DL — SIGNIFICANT CHANGE UP (ref 0.7–1.5)
CRP SERPL-MCNC: 0.62 MG/DL — HIGH (ref 0–0.4)
CULTURE RESULTS: SIGNIFICANT CHANGE UP
DSDNA AB SER-ACNC: 23 IU/ML — SIGNIFICANT CHANGE UP
EOSINOPHIL # BLD AUTO: 0 K/UL — SIGNIFICANT CHANGE UP (ref 0–0.7)
EOSINOPHIL NFR BLD AUTO: 0 % — SIGNIFICANT CHANGE UP (ref 0–8)
ESTIMATED AVERAGE GLUCOSE: 171 MG/DL — HIGH (ref 68–114)
GLUCOSE BLDC GLUCOMTR-MCNC: 205 MG/DL — HIGH (ref 70–99)
GLUCOSE BLDC GLUCOMTR-MCNC: 226 MG/DL — HIGH (ref 70–99)
GLUCOSE BLDC GLUCOMTR-MCNC: 268 MG/DL — HIGH (ref 70–99)
GLUCOSE BLDC GLUCOMTR-MCNC: 293 MG/DL — HIGH (ref 70–99)
GLUCOSE SERPL-MCNC: 316 MG/DL — HIGH (ref 70–99)
HCT VFR BLD CALC: 42.4 % — SIGNIFICANT CHANGE UP (ref 42–52)
HDLC SERPL-MCNC: 48 MG/DL — SIGNIFICANT CHANGE UP
HGB BLD-MCNC: 13.4 G/DL — LOW (ref 14–18)
IMM GRANULOCYTES NFR BLD AUTO: 1.1 % — HIGH (ref 0.1–0.3)
LIPID PNL WITH DIRECT LDL SERPL: 89 MG/DL — SIGNIFICANT CHANGE UP
LYMPHOCYTES # BLD AUTO: 0.57 K/UL — LOW (ref 1.2–3.4)
LYMPHOCYTES # BLD AUTO: 6.9 % — LOW (ref 20.5–51.1)
MAGNESIUM SERPL-MCNC: 2.1 MG/DL — SIGNIFICANT CHANGE UP (ref 1.8–2.4)
MCHC RBC-ENTMCNC: 26.4 PG — LOW (ref 27–31)
MCHC RBC-ENTMCNC: 31.6 G/DL — LOW (ref 32–37)
MCV RBC AUTO: 83.6 FL — SIGNIFICANT CHANGE UP (ref 80–94)
METHOD TYPE: SIGNIFICANT CHANGE UP
MONOCYTES # BLD AUTO: 0.1 K/UL — SIGNIFICANT CHANGE UP (ref 0.1–0.6)
MONOCYTES NFR BLD AUTO: 1.2 % — LOW (ref 1.7–9.3)
NEUTROPHILS # BLD AUTO: 7.51 K/UL — HIGH (ref 1.4–6.5)
NEUTROPHILS NFR BLD AUTO: 90.7 % — HIGH (ref 42.2–75.2)
NON HDL CHOLESTEROL: 101 MG/DL — SIGNIFICANT CHANGE UP
NRBC # BLD: 0 /100 WBCS — SIGNIFICANT CHANGE UP (ref 0–0)
ORGANISM # SPEC MICROSCOPIC CNT: SIGNIFICANT CHANGE UP
ORGANISM # SPEC MICROSCOPIC CNT: SIGNIFICANT CHANGE UP
PLATELET # BLD AUTO: 224 K/UL — SIGNIFICANT CHANGE UP (ref 130–400)
POTASSIUM SERPL-MCNC: 4.9 MMOL/L — SIGNIFICANT CHANGE UP (ref 3.5–5)
POTASSIUM SERPL-SCNC: 4.9 MMOL/L — SIGNIFICANT CHANGE UP (ref 3.5–5)
RBC # BLD: 5.07 M/UL — SIGNIFICANT CHANGE UP (ref 4.7–6.1)
RBC # FLD: 14.1 % — SIGNIFICANT CHANGE UP (ref 11.5–14.5)
SODIUM SERPL-SCNC: 137 MMOL/L — SIGNIFICANT CHANGE UP (ref 135–146)
SPECIMEN SOURCE: SIGNIFICANT CHANGE UP
T PALLIDUM AB TITR SER: NEGATIVE — SIGNIFICANT CHANGE UP
TRIGL SERPL-MCNC: 138 MG/DL — SIGNIFICANT CHANGE UP
WBC # BLD: 8.28 K/UL — SIGNIFICANT CHANGE UP (ref 4.8–10.8)
WBC # FLD AUTO: 8.28 K/UL — SIGNIFICANT CHANGE UP (ref 4.8–10.8)

## 2020-12-23 PROCEDURE — 99233 SBSQ HOSP IP/OBS HIGH 50: CPT | Mod: CS

## 2020-12-23 RX ADMIN — AMPICILLIN SODIUM AND SULBACTAM SODIUM 200 GRAM(S): 250; 125 INJECTION, POWDER, FOR SUSPENSION INTRAMUSCULAR; INTRAVENOUS at 11:47

## 2020-12-23 RX ADMIN — VALACYCLOVIR 500 MILLIGRAM(S): 500 TABLET, FILM COATED ORAL at 11:48

## 2020-12-23 RX ADMIN — Medication 5 UNIT(S): at 11:47

## 2020-12-23 RX ADMIN — AMPICILLIN SODIUM AND SULBACTAM SODIUM 200 GRAM(S): 250; 125 INJECTION, POWDER, FOR SUSPENSION INTRAMUSCULAR; INTRAVENOUS at 05:55

## 2020-12-23 RX ADMIN — Medication 6 MILLIGRAM(S): at 13:08

## 2020-12-23 RX ADMIN — Medication 6 MILLIGRAM(S): at 21:21

## 2020-12-23 RX ADMIN — Medication 5 UNIT(S): at 17:29

## 2020-12-23 RX ADMIN — Medication 3: at 11:47

## 2020-12-23 RX ADMIN — LOSARTAN POTASSIUM 100 MILLIGRAM(S): 100 TABLET, FILM COATED ORAL at 05:56

## 2020-12-23 RX ADMIN — Medication 30 MILLIGRAM(S): at 05:56

## 2020-12-23 RX ADMIN — Medication 2: at 08:11

## 2020-12-23 RX ADMIN — ATORVASTATIN CALCIUM 40 MILLIGRAM(S): 80 TABLET, FILM COATED ORAL at 21:21

## 2020-12-23 RX ADMIN — Medication 1 APPLICATION(S): at 21:21

## 2020-12-23 RX ADMIN — Medication 81 MILLIGRAM(S): at 11:48

## 2020-12-23 RX ADMIN — INSULIN GLARGINE 12 UNIT(S): 100 INJECTION, SOLUTION SUBCUTANEOUS at 22:02

## 2020-12-23 RX ADMIN — Medication 50 MILLIGRAM(S): at 17:29

## 2020-12-23 RX ADMIN — ENOXAPARIN SODIUM 40 MILLIGRAM(S): 100 INJECTION SUBCUTANEOUS at 11:49

## 2020-12-23 RX ADMIN — Medication 6 MILLIGRAM(S): at 05:55

## 2020-12-23 RX ADMIN — AMPICILLIN SODIUM AND SULBACTAM SODIUM 200 GRAM(S): 250; 125 INJECTION, POWDER, FOR SUSPENSION INTRAMUSCULAR; INTRAVENOUS at 18:14

## 2020-12-23 RX ADMIN — Medication 2: at 17:29

## 2020-12-23 RX ADMIN — Medication 50 MILLIGRAM(S): at 05:56

## 2020-12-23 RX ADMIN — Medication 5 UNIT(S): at 08:11

## 2020-12-23 RX ADMIN — PANTOPRAZOLE SODIUM 40 MILLIGRAM(S): 20 TABLET, DELAYED RELEASE ORAL at 05:56

## 2020-12-23 NOTE — PROGRESS NOTE ADULT - SUBJECTIVE AND OBJECTIVE BOX
YAHIR OSPINA 70y Male  MRN#: [id number]   Hospital Day: 3d    SUBJECTIVE  Patient is a 70y old Male who presents with a chief complaint of right sided facial paralysis (22 Dec 2020 15:48)  Currently admitted to medicine with the primary diagnosis of Facial droop      INTERVAL HPI AND OVERNIGHT EVENTS:  Patient was examined and seen at bedside. This morning he is resting comfortably in bed and reports no issues or overnight events.    REVIEW OF SYMPTOMS:  CONSTITUTIONAL: No weakness, fevers or chills; No headaches  EYES: No visual changes, eye pain, or discharge  ENT: No vertigo; No ear pain or change in hearing; No sore throat or difficulty swallowing  NECK: No pain or stiffness  RESPIRATORY: No cough, wheezing, or hemoptysis; No shortness of breath  CARDIOVASCULAR: No chest pain or palpitations  GASTROINTESTINAL: No abdominal or epigastric pain; No nausea, vomiting, or hematemesis; No diarrhea or constipation; No melena or hematochezia  GENITOURINARY: No dysuria, frequency or hematuria  MUSCULOSKELETAL: No joint pain, no muscle pain, no weakness  NEUROLOGICAL: No numbness or weakness  SKIN: No itching or rashes    OBJECTIVE  PAST MEDICAL & SURGICAL HISTORY  CAD (coronary artery disease)  s/p CABG X3    Hypercholesteremia    Diabetes    HTN (hypertension)    S/P CABG (coronary artery bypass graft)      ALLERGIES:  No Known Allergies    MEDICATIONS:  STANDING MEDICATIONS  ampicillin/sulbactam  IVPB 3 Gram(s) IV Intermittent every 6 hours  artificial  tears Solution 1 Drop(s) Both EYES daily  aspirin enteric coated 81 milliGRAM(s) Oral daily  atorvastatin 40 milliGRAM(s) Oral at bedtime  dexAMETHasone  Injectable 6 milliGRAM(s) IV Push every 8 hours  enoxaparin Injectable 40 milliGRAM(s) SubCutaneous daily  insulin glargine Injectable (LANTUS) 12 Unit(s) SubCutaneous at bedtime  insulin lispro (ADMELOG) corrective regimen sliding scale   SubCutaneous three times a day before meals  insulin lispro Injectable (ADMELOG) 5 Unit(s) SubCutaneous three times a day before meals  LORazepam   Injectable 2 milliGRAM(s) IV Push once  losartan 100 milliGRAM(s) Oral daily  metoprolol tartrate 50 milliGRAM(s) Oral two times a day  NIFEdipine XL 30 milliGRAM(s) Oral daily  pantoprazole    Tablet 40 milliGRAM(s) Oral before breakfast  petrolatum Ophthalmic Ointment 1 Application(s) Both EYES at bedtime  valACYclovir 500 milliGRAM(s) Oral daily    PRN MEDICATIONS      VITAL SIGNS: Last 24 Hours  T(C): 36.2 (23 Dec 2020 05:10), Max: 36.7 (22 Dec 2020 21:01)  T(F): 97.1 (23 Dec 2020 05:10), Max: 98.1 (22 Dec 2020 21:01)  HR: 74 (23 Dec 2020 05:10) (71 - 82)  BP: 163/73 (23 Dec 2020 05:10) (163/73 - 185/87)  BP(mean): --  RR: 20 (23 Dec 2020 05:10) (18 - 20)  SpO2: 97% (22 Dec 2020 21:01) (97% - 98%)    LABS:                        12.6   8.51  )-----------( 215      ( 22 Dec 2020 17:24 )             39.1     12-22    138  |  103  |  21<H>  ----------------------------<  165<H>  4.7   |  24  |  1.4    Ca    9.6      22 Dec 2020 17:24            Sedimentation Rate, Erythrocyte: 54 mm/Hr <H> (12-22-20 @ 11:00)      Culture - Other (collected 21 Dec 2020 14:29)  Source: .Other R ear culture  Preliminary Report (22 Dec 2020 19:41):    Numerous Staphylococcus aureus          RADIOLOGY:  < from: CT Maxillofacial w/ IV Cont (12.22.20 @ 20:37) >  IMPRESSION:    Near complete opacification of right mastoid air cells, without definite osseous erosion of the right temporal bone. No adjacent pneumocephalus visualized.    < end of copied text >      PHYSICAL EXAM:  CONSTITUTIONAL: No acute distress, well-developed, well-groomed, AAOx3  HEAD: Atraumatic, normocephalic  EYES: EOM intact, PERRLA, conjunctiva and sclera clear  ENT: Supple, no masses, no thyromegaly, no bruits, no JVD; moist mucous membranes  PULMONARY: Clear to auscultation bilaterally; no wheezes, rales, or rhonchi  CARDIOVASCULAR: Regular rate and rhythm; no murmurs, rubs, or gallops  GASTROINTESTINAL: Soft, non-tender, non-distended; bowel sounds present  MUSCULOSKELETAL: 2+ peripheral pulses; no clubbing, no cyanosis, no edema  NEUROLOGY: Left ptosis and right facial paralysis  SKIN: No rashes or lesions; warm and dry    ASSESSMENT & PLAN:  Patient is a 71yo male with PMH of CAD s/p CABG, diabetes mellitus, hypertension, hyperlipidemia, and epistaxis who presented to the ED complaining of right-sided facial droop and left eye droop starting the evening of presentation.    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 No evidence of carotid or vertebral artery stenosis  - MR IAC 12/21/2020: no acute intracranial pathology; extensive right mastoid effusions, correlate for otomastoiditis; The left oculomotor nerve is noted to be mildly displaced inferiorly by the left PCA and abuts the inferior aspect of the left PCA, possibly evident for neurovascular compression  - CT maxillofacial with IV contrast 12/22/2020: near complete opacification of right mastoid air cells without erosion of right temporal bone  - Neurology consult appreciated  - ENT consult appreciated: start Unasyn 3g IV Q6H and dexamethasone 6mg IV Q8H  - Continue with valacyclovir 500mg PO once daily   - Follow myasthenia gravis antibodies    #Left-sided ptosis, 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 No evidence of carotid or vertebral artery stenosis  - MR IAC 12/21/2020: no acute intracranial pathology; extensive right mastoid effusions, correlate for otomastoiditis; The left oculomotor nerve is noted to be mildly displaced inferiorly by the left PCA and abuts the inferior aspect of the left PCA, possibly evident for neurovascular compression  - ESR 54, CRP 0.85  - Follow HEATHER and DsDNA  - Neurosurgery consult pending    #COVID-19 pneumonia, asymptomatic  - COVID-19 PCR positive  - Isolation precautions (contact, droplet, airborne)  - Chest X-ray: no acute cardiopulmonary process  - Patient is saturating 96% on room air  - C-Reactive Protein, Serum: 0.85 mg/dL (12-21-20 @ 05:35)  - D-Dimer Assay, Quantitative: 148 ng/mL DDU (12-21-20 @ 05:35)  - Procalcitonin, Serum: 0.08 ng/mL (12-21-20 @ 05:35)  - Repeat inflammatory markers (D-dimer, CRP, ferritin) Q48-72H if clinically worsening  - ID consult appreciated: no indications for treatment of COVID-19 at this time  - DVT prophylaxis per protocol  - Titrate supplemental O2 to maintain SpO2 92-96%    #Acute kidney injury on CKD stage 3, improving  - Possibly pre-renal vs ATN  - Baseline creatinine: 13 (10/17/2018)  - Creatinine: 2.0->1.5->1.4  - Avoid nephrotoxic agents  - Monitor BUN/creatinine     #CAD s/p CABG x3  - Continue with aspirin 81mg PO once daily, atorvastatin 40mg PO at bedtime, metoprolol tartrate 50mg PO twice daily    #Diabetes mellitus type 2  - Hemoglobin A1c in AM  - Hold home oral medications  - Start insulin glargine 12 units SQ QHS  - Start insulin lispro 5 units SQ TID  - Insulin sliding scale coverage  - Monitor fingerstick glucose, especially as patient will be on IV steroids    #Hypertension  - Continue with metoprolol tartrate 50mg PO twice daily and nifedipine 30mg PO once daily  - Restart losartan 100mg PO once daily but holding hydrochlorothiazide for now    #Hyperlipidemia  - Continue with atorvastatin 40mg PO at bedtime  - Follow lipid profile    #Misc  - DVT Prophylaxis: heparin 5000 units SQ Q8H   - GI Prophylaxis: pantoprazole 40mg PO once daily   - Diet: DASH/TLC  - Activity: ambulate as tolerated  - IV Fluids: not indicated   - Code Status: Full Code    Dispo: acute

## 2020-12-23 NOTE — PROGRESS NOTE ADULT - ASSESSMENT
71 yo M a/w R sided facial palsy; CT MF without definite osseous erosion of the right temporal bone. No adjacent pneumocephalus visualized. Patient overall doing well, + R facial droop, R ear clear with no active drainage noted     Plan:   - Continue Unasyn   - Continue Valtrex   - continue decadron 6q 8, s/p Dec 10mg x 1 dose   - Continue Ciprodex   - Continue Eye patch and eye drops   - Will d/w attending

## 2020-12-23 NOTE — PROGRESS NOTE ADULT - ASSESSMENT
ASSESSMENT  69 yo M with pmhx of CAD s/p CABG, DM, HTN, epistaxis presented to the ED with Right sided facial droop and Left eye Ptosis this evening.    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 The left oculomotor nerve is noted to be mildly displaced inferiorly by the left PCA and abuts the inferior aspect of the left PCA, this is of uncertain clinical significance but can be seen in a setting of neurovascular compression. Extensive right mastoid effusions, nonspecific, correlate for otomastoiditis.    #MYESHA  #CAD s/p CABG  #Obesity BMI (kg/m2): 32.6  #Abx allergy: NKDA    Creatinine, Serum: 1.5 mg/dL (12.21.20 @ 05:35)  Weight (kg): 91.6 (20 Dec 2020 20:33)  CrCl 58    RECOMMENDATIONS  - please obtain ESR  - continue unasyn 3g q 6 hours for mastoiditis  - on valtrex   - Patient does not meet criteria for COVID-19 therapeutics (SpO2 =94% on room air, and requiring supplemental oxygen)  - trend inflammatory markers    Please call or message on [employer] Teams if with any questions.  Spectra [id number]

## 2020-12-23 NOTE — PROGRESS NOTE ADULT - SUBJECTIVE AND OBJECTIVE BOX
YAHIR OSPINA  70y, Male  Allergy: No Known Allergies      LOS  3d    CHIEF COMPLAINT: right sided facial paralysis (23 Dec 2020 10:32)      INTERVAL EVENTS/HPI  - No acute events overnight  - T(F): , Max: 98.1 (12-22-20 @ 21:01)  - right ear mastoiditis pain seems to be improving   - Tolerating medication  - WBC Count: 8.28 (12-23-20 @ 09:17)  WBC Count: 8.51 (12-22-20 @ 17:24)     - Creatinine, Serum: 1.2 (12-23-20 @ 09:17)  Creatinine, Serum: 1.4 (12-22-20 @ 17:24)       ROS  General: Denies rigors, nightsweats  HEENT: Denies headache, rhinorrhea, sore throat, eye pain  CV: Denies CP, palpitations  PULM: Denies wheezing, hemoptysis  GI: Denies hematemesis, hematochezia, melena  : Denies discharge, hematuria  MSK: Denies arthralgias, myalgias  SKIN: Denies rash, lesions  NEURO: Denies paresthesias, weakness  PSYCH: Denies depression, anxiety    VITALS:  T(F): 97.1, Max: 98.1 (12-22-20 @ 21:01)  HR: 74  BP: 163/73  RR: 20Vital Signs Last 24 Hrs  T(C): 36.2 (23 Dec 2020 05:10), Max: 36.7 (22 Dec 2020 21:01)  T(F): 97.1 (23 Dec 2020 05:10), Max: 98.1 (22 Dec 2020 21:01)  HR: 74 (23 Dec 2020 05:10) (71 - 82)  BP: 163/73 (23 Dec 2020 05:10) (163/73 - 185/87)  BP(mean): --  RR: 20 (23 Dec 2020 05:10) (18 - 20)  SpO2: 97% (22 Dec 2020 21:01) (97% - 98%)    PHYSICAL EXAM:  Gen: NAD, resting in bed  HEENT: Normocephalic, atraumatic  Neck: supple, no lymphadenopathy  CV: Regular rate & regular rhythm  Lungs: decreased BS at bases, no fremitus  Abdomen: Soft, BS present  Ext: Warm, well perfused  Neuro: non focal, awake  Skin: no rash, no erythema  Lines: no phlebitis    FH: Non-contributory  Social Hx: Non-contributory    TESTS & MEASUREMENTS:                        13.4   8.28  )-----------( 224      ( 23 Dec 2020 09:17 )             42.4     12-23    137  |  101  |  23<H>  ----------------------------<  316<H>  4.9   |  24  |  1.2    Ca    9.3      23 Dec 2020 09:17  Mg     2.1     12-23      eGFR if African American: 70 mL/min/1.73M2 (12-23-20 @ 09:17)  eGFR if Non African American: 60 mL/min/1.73M2 (12-23-20 @ 09:17)  eGFR if Non African American: 50 mL/min/1.73M2 (12-22-20 @ 17:24)  eGFR if : 58 mL/min/1.73M2 (12-22-20 @ 17:24)          Culture - Other (collected 12-21-20 @ 14:29)  Source: .Other R ear culture  Preliminary Report (12-22-20 @ 19:41):    Numerous Staphylococcus aureus            INFECTIOUS DISEASES TESTING  Procalcitonin, Serum: 0.08 (12-21-20 @ 05:35)  Rapid RVP Result: Detected (12-20-20 @ 21:07)      INFLAMMATORY MARKERS  Sedimentation Rate, Erythrocyte: 54 mm/Hr (12-22-20 @ 11:00)  C-Reactive Protein, Serum: 0.85 mg/dL (12-21-20 @ 05:35)  Sedimentation Rate, Erythrocyte: 43 mm/Hr (12-21-20 @ 05:35)      RADIOLOGY & ADDITIONAL TESTS:  I have personally reviewed the last available Chest xray  CXR  Xray Chest 1 View- PORTABLE-Urgent:   EXAM:  XR CHEST PORTABLE URGENT 1V            PROCEDURE DATE:  12/21/2020            INTERPRETATION:  Clinical History / Reason for exam: Shortness of breath    Comparison : Chest radiograph 4/15/2011    Technique/Positioning: Single frontal chest radiograph.    Findings:    Support devices: None.    Cardiac/mediastinum/hilum: Normal size heart. Surgical clips overlying the mediastinum.    Lung parenchyma/Pleura: No focal pulmonary consolidation. No pneumothorax.    Skeleton/soft tissues: Unremarkable.    Impression:    No focal pulmonary consolidation.              NORAH PASPULATI MD; Attending Radiologist  This document has been electronically signed. Dec 21 2020 10:42AM (12-21-20 @ 02:26)      CT      CARDIOLOGY TESTING  12 Lead ECG:   Ventricular Rate 77 BPM    Atrial Rate 77 BPM    P-R Interval 200 ms    QRS Duration 90 ms    Q-T Interval 392 ms    QTC Calculation(Bazett) 443 ms    P Axis 31 degrees    R Axis 29 degrees    T Axis 4 degrees    Diagnosis Line Normal sinus rhythm  Minimal voltage criteria for LVH, may be normal variant ( R in aVL )  Inferior infarct , age undetermined  Anterior infarct , age undetermined  Abnormal ECG    Confirmed by DUANE BHAGAT MD ([id number]) on 12/20/2020 10:08:40 PM (12-20-20 @ 20:39)      MEDICATIONS  ampicillin/sulbactam  IVPB 3 IV Intermittent every 6 hours  artificial  tears Solution 1 Both EYES daily  aspirin enteric coated 81 Oral daily  atorvastatin 40 Oral at bedtime  dexAMETHasone  Injectable 6 IV Push every 8 hours  enoxaparin Injectable 40 SubCutaneous daily  insulin glargine Injectable (LANTUS) 12 SubCutaneous at bedtime  insulin lispro (ADMELOG) corrective regimen sliding scale  SubCutaneous three times a day before meals  insulin lispro Injectable (ADMELOG) 5 SubCutaneous three times a day before meals  LORazepam   Injectable 2 IV Push once  losartan 100 Oral daily  metoprolol tartrate 50 Oral two times a day  NIFEdipine XL 30 Oral daily  pantoprazole    Tablet 40 Oral before breakfast  petrolatum Ophthalmic Ointment 1 Both EYES at bedtime  valACYclovir 500 Oral daily      WEIGHT  Weight (kg): 91.6 (12-20-20 @ 20:33)  Creatinine, Serum: 1.2 mg/dL (12-23-20 @ 09:17)  Creatinine, Serum: 1.4 mg/dL (12-22-20 @ 17:24)      ANTIBIOTICS:  ampicillin/sulbactam  IVPB 3 Gram(s) IV Intermittent every 6 hours  valACYclovir 500 milliGRAM(s) Oral daily      All available historical records have been reviewed

## 2020-12-23 NOTE — PROGRESS NOTE ADULT - SUBJECTIVE AND OBJECTIVE BOX
ENT DAILY PROGRESS NOTE    Pt is a 70y Male a/w R sided facial droop. Patient seen and examined at bedside. Patient reports R facial droop improved from yesterday, denies any R ear otalgia. Denies any fever, chills, N/V, SOB, CP, tinnitus, decreased hearing b/l. No acute overnight events.       REVIEW OF SYSTEMS   [x] A ten-point review of systems was otherwise negative except as noted.  [ ] Due to altered mental status/intubation, subjective information were not able to be obtained from patient. History was obtained, to the extent possible, from review of the chart and collateral sources of information.    Allergies    No Known Allergies    Intolerances        MEDICATIONS:  ampicillin/sulbactam  IVPB 3 Gram(s) IV Intermittent every 6 hours  artificial  tears Solution 1 Drop(s) Both EYES daily  aspirin enteric coated 81 milliGRAM(s) Oral daily  atorvastatin 40 milliGRAM(s) Oral at bedtime  dexAMETHasone  Injectable 6 milliGRAM(s) IV Push every 8 hours  enoxaparin Injectable 40 milliGRAM(s) SubCutaneous daily  insulin glargine Injectable (LANTUS) 12 Unit(s) SubCutaneous at bedtime  insulin lispro (ADMELOG) corrective regimen sliding scale   SubCutaneous three times a day before meals  insulin lispro Injectable (ADMELOG) 5 Unit(s) SubCutaneous three times a day before meals  LORazepam   Injectable 2 milliGRAM(s) IV Push once  losartan 100 milliGRAM(s) Oral daily  metoprolol tartrate 50 milliGRAM(s) Oral two times a day  NIFEdipine XL 30 milliGRAM(s) Oral daily  pantoprazole    Tablet 40 milliGRAM(s) Oral before breakfast  petrolatum Ophthalmic Ointment 1 Application(s) Both EYES at bedtime  valACYclovir 500 milliGRAM(s) Oral daily      Vital Signs Last 24 Hrs  T(C): 36.2 (23 Dec 2020 05:10), Max: 36.7 (22 Dec 2020 21:01)  T(F): 97.1 (23 Dec 2020 05:10), Max: 98.1 (22 Dec 2020 21:01)  HR: 74 (23 Dec 2020 05:10) (71 - 82)  BP: 163/73 (23 Dec 2020 05:10) (163/73 - 185/87)  BP(mean): --  RR: 20 (23 Dec 2020 05:10) (18 - 20)  SpO2: 97% (22 Dec 2020 21:01) (97% - 98%)      12-22 @ 07:01  -  12-23 @ 07:00  --------------------------------------------------------  IN:    IV PiggyBack: 300 mL    Lactated Ringers: 900 mL    Oral Fluid: 547 mL  Total IN: 1747 mL    OUT:    Voided (mL): 850 mL  Total OUT: 850 mL    Total NET: 897 mL          PHYSICAL EXAM:    GEN: Well-developed, well-nourished. NAD, awake and alert. No drooling or pooling of secretions. No stridor or stertor. Good vocal quality, no hoarseness.   SKIN: Good color, non diaphoretic  HEENT: + R ear noted with, no preauricular/postauricular tenderness, EAC clear no active drainage noted, TM visualized noted with small pinhole perforation, no active drainage noted, no TTP, L ear w/ normal limits, L EAC clear, TM visualized &intact, , no active drainage, erythema/edema, +R facial droop - tongue symmetrical   NECK:  Trachea midline. Neck supple, no TTP to B/L lateral neck, no cervical LAD.  RESP: No dyspnea, non-labored breathing. No use of accessory muscles.  CARDIO: +S1/S2  ABDO: Soft, NT.  EXT: HAZEL x 4    LABS:  CBC-                        12.6   8.51  )-----------( 215      ( 22 Dec 2020 17:24 )             39.1     BMP/CMP-  22 Dec 2020 17:24    138    |  103    |  21     ----------------------------<  165    4.7     |  24     |  1.4      Ca    9.6        22 Dec 2020 17:24      Coagulation Studies-    Endocrine Panel-  Calcium, Total Serum: 9.6 mg/dL (12-22 @ 17:24)              RADIOLOGY & ADDITIONAL STUDIES:  < from: CT Maxillofacial w/ IV Cont (12.22.20 @ 20:37) >    EXAM:  CT MAXILLOFACIAL IC            PROCEDURE DATE:  12/22/2020            INTERPRETATION:  CLINICAL HISTORY / REASON FOR EXAM: Right facial paralysis with left eye ptosis    TECHNIQUE: Multiple contiguous CT axial images of the temporal bones with coronal and sagittal reformats submitted for review.    COMPARISON: None available    FINDINGS:    Near complete opacification of right mastoid air cells, without definite osseous erosion of the right temporal bone. No adjacent pneumocephalus visualized.    The rest of the visualized paranasal sinuses and left mastoid air cells are well aerated.    No acutely displaced fractures or dislocation.    Visualized brain parenchyma is otherwise unremarkable. Bilateral orbits are unremarkable.    IMPRESSION:    Near complete opacification of right mastoid air cells, without definite osseous erosion of the right temporal bone. No adjacent pneumocephalus visualized.            GION OSPINA M.D., RESIDENT RADIOLOGIST  This document has been electronically signed.  LEILA TURCIOS MD; Attending Interventional Radiologist  This document has been electronically signed. Dec 22 2020 10:19PM    < end of copied text >

## 2020-12-24 ENCOUNTER — TRANSCRIPTION ENCOUNTER (OUTPATIENT)
Age: 70
End: 2020-12-24

## 2020-12-24 VITALS
RESPIRATION RATE: 18 BRPM | DIASTOLIC BLOOD PRESSURE: 72 MMHG | HEART RATE: 63 BPM | TEMPERATURE: 97 F | OXYGEN SATURATION: 97 % | SYSTOLIC BLOOD PRESSURE: 141 MMHG

## 2020-12-24 LAB
ANION GAP SERPL CALC-SCNC: 12 MMOL/L — SIGNIFICANT CHANGE UP (ref 7–14)
BASOPHILS # BLD AUTO: 0.01 K/UL — SIGNIFICANT CHANGE UP (ref 0–0.2)
BASOPHILS NFR BLD AUTO: 0.1 % — SIGNIFICANT CHANGE UP (ref 0–1)
BUN SERPL-MCNC: 28 MG/DL — HIGH (ref 10–20)
CALCIUM SERPL-MCNC: 9 MG/DL — SIGNIFICANT CHANGE UP (ref 8.5–10.1)
CHLORIDE SERPL-SCNC: 105 MMOL/L — SIGNIFICANT CHANGE UP (ref 98–110)
CO2 SERPL-SCNC: 25 MMOL/L — SIGNIFICANT CHANGE UP (ref 17–32)
CREAT SERPL-MCNC: 1.3 MG/DL — SIGNIFICANT CHANGE UP (ref 0.7–1.5)
CRP SERPL-MCNC: 0.2 MG/DL — SIGNIFICANT CHANGE UP (ref 0–0.4)
D DIMER BLD IA.RAPID-MCNC: 136 NG/ML DDU — SIGNIFICANT CHANGE UP (ref 0–230)
EOSINOPHIL # BLD AUTO: 0 K/UL — SIGNIFICANT CHANGE UP (ref 0–0.7)
EOSINOPHIL NFR BLD AUTO: 0 % — SIGNIFICANT CHANGE UP (ref 0–8)
ERYTHROCYTE [SEDIMENTATION RATE] IN BLOOD: 20 MM/HR — HIGH (ref 0–10)
GLUCOSE BLDC GLUCOMTR-MCNC: 202 MG/DL — HIGH (ref 70–99)
GLUCOSE BLDC GLUCOMTR-MCNC: 264 MG/DL — HIGH (ref 70–99)
GLUCOSE SERPL-MCNC: 224 MG/DL — HIGH (ref 70–99)
HCT VFR BLD CALC: 41.3 % — LOW (ref 42–52)
HGB BLD-MCNC: 13.1 G/DL — LOW (ref 14–18)
IMM GRANULOCYTES NFR BLD AUTO: 0.6 % — HIGH (ref 0.1–0.3)
LYMPHOCYTES # BLD AUTO: 0.66 K/UL — LOW (ref 1.2–3.4)
LYMPHOCYTES # BLD AUTO: 6.1 % — LOW (ref 20.5–51.1)
MAGNESIUM SERPL-MCNC: 2.2 MG/DL — SIGNIFICANT CHANGE UP (ref 1.8–2.4)
MCHC RBC-ENTMCNC: 26.4 PG — LOW (ref 27–31)
MCHC RBC-ENTMCNC: 31.7 G/DL — LOW (ref 32–37)
MCV RBC AUTO: 83.3 FL — SIGNIFICANT CHANGE UP (ref 80–94)
MONOCYTES # BLD AUTO: 0.31 K/UL — SIGNIFICANT CHANGE UP (ref 0.1–0.6)
MONOCYTES NFR BLD AUTO: 2.9 % — SIGNIFICANT CHANGE UP (ref 1.7–9.3)
NEUTROPHILS # BLD AUTO: 9.76 K/UL — HIGH (ref 1.4–6.5)
NEUTROPHILS NFR BLD AUTO: 90.3 % — HIGH (ref 42.2–75.2)
NRBC # BLD: 0 /100 WBCS — SIGNIFICANT CHANGE UP (ref 0–0)
PLATELET # BLD AUTO: 217 K/UL — SIGNIFICANT CHANGE UP (ref 130–400)
POTASSIUM SERPL-MCNC: 4.4 MMOL/L — SIGNIFICANT CHANGE UP (ref 3.5–5)
POTASSIUM SERPL-SCNC: 4.4 MMOL/L — SIGNIFICANT CHANGE UP (ref 3.5–5)
PROCALCITONIN SERPL-MCNC: 0.04 NG/ML — SIGNIFICANT CHANGE UP (ref 0.02–0.1)
RBC # BLD: 4.96 M/UL — SIGNIFICANT CHANGE UP (ref 4.7–6.1)
RBC # FLD: 14.2 % — SIGNIFICANT CHANGE UP (ref 11.5–14.5)
SODIUM SERPL-SCNC: 142 MMOL/L — SIGNIFICANT CHANGE UP (ref 135–146)
WBC # BLD: 10.81 K/UL — HIGH (ref 4.8–10.8)
WBC # FLD AUTO: 10.81 K/UL — HIGH (ref 4.8–10.8)

## 2020-12-24 PROCEDURE — 99231 SBSQ HOSP IP/OBS SF/LOW 25: CPT

## 2020-12-24 PROCEDURE — 99239 HOSP IP/OBS DSCHRG MGMT >30: CPT | Mod: CS

## 2020-12-24 RX ORDER — VALACYCLOVIR 500 MG/1
1 TABLET, FILM COATED ORAL
Qty: 2 | Refills: 0
Start: 2020-12-24 | End: 2020-12-25

## 2020-12-24 RX ADMIN — PANTOPRAZOLE SODIUM 40 MILLIGRAM(S): 20 TABLET, DELAYED RELEASE ORAL at 05:38

## 2020-12-24 RX ADMIN — Medication 6 MILLIGRAM(S): at 05:37

## 2020-12-24 RX ADMIN — LOSARTAN POTASSIUM 100 MILLIGRAM(S): 100 TABLET, FILM COATED ORAL at 05:37

## 2020-12-24 RX ADMIN — AMPICILLIN SODIUM AND SULBACTAM SODIUM 200 GRAM(S): 250; 125 INJECTION, POWDER, FOR SUSPENSION INTRAMUSCULAR; INTRAVENOUS at 00:29

## 2020-12-24 RX ADMIN — Medication 5 UNIT(S): at 12:12

## 2020-12-24 RX ADMIN — Medication 5 UNIT(S): at 08:13

## 2020-12-24 RX ADMIN — Medication 6 MILLIGRAM(S): at 13:05

## 2020-12-24 RX ADMIN — Medication 30 MILLIGRAM(S): at 05:38

## 2020-12-24 RX ADMIN — VALACYCLOVIR 500 MILLIGRAM(S): 500 TABLET, FILM COATED ORAL at 12:20

## 2020-12-24 RX ADMIN — ENOXAPARIN SODIUM 40 MILLIGRAM(S): 100 INJECTION SUBCUTANEOUS at 12:20

## 2020-12-24 RX ADMIN — Medication 81 MILLIGRAM(S): at 12:20

## 2020-12-24 RX ADMIN — Medication 1 DROP(S): at 12:20

## 2020-12-24 RX ADMIN — Medication 50 MILLIGRAM(S): at 05:38

## 2020-12-24 RX ADMIN — Medication 2: at 08:13

## 2020-12-24 RX ADMIN — AMPICILLIN SODIUM AND SULBACTAM SODIUM 200 GRAM(S): 250; 125 INJECTION, POWDER, FOR SUSPENSION INTRAMUSCULAR; INTRAVENOUS at 05:37

## 2020-12-24 RX ADMIN — Medication 3: at 12:11

## 2020-12-24 NOTE — DISCHARGE NOTE PROVIDER - NSDCMRMEDTOKEN_GEN_ALL_CORE_FT
amoxicillin-clavulanate 875 mg-125 mg oral tablet: 1 tab(s) orally 2 times a day  Aspir 81 oral delayed release tablet: 1 tab(s) orally once a day  glimepiride 1 mg oral tablet: 1 tab(s) orally once a day  losartan-hydrochlorothiazide 100mg-12.5mg oral tablet: 1 tab(s) orally once a day  metFORMIN 750 mg oral tablet, extended release: 1 tab(s) orally once a day  metoprolol tartrate 50 mg oral tablet: 1 tab(s) orally 2 times a day  NIFEdipine 30 mg oral tablet, extended release: 1 tab(s) orally once a day  predniSONE 20 mg oral tablet: 2 tab(s) orally once a day   rosuvastatin 10 mg oral capsule: 1 cap(s) orally once a day  valACYclovir 500 mg oral tablet: 1 tab(s) orally once a day

## 2020-12-24 NOTE — DISCHARGE NOTE PROVIDER - CARE PROVIDERS DIRECT ADDRESSES
[email or web address]

## 2020-12-24 NOTE — PROGRESS NOTE ADULT - ATTENDING COMMENTS
I saw and examined the patient at bedside.   He is still with right facial droop, left ptosis is improving.   Etiology: CVA ruled out, brain MRI negative for acute infarction. Right facial droop likely viral, left eye ptosis likely from COVID-19 infection. Continue Valtrex and Dexamethasone.   COVID-19 pneumonia: stable, no hypoxia.   Right side Mastoiditis: follow up with ENT, continue Unasyn.
I saw and examined the patient at bedside.   He is still with right facial droop, left ptosis is improving.   Etiology: CVA ruled out, brain MRI negative for acute infarction. Right facial droop likely viral, left eye ptosis likely from COVID-19 infection. Continue Valtrex and Dexamethasone. Discharge on Prednisone 40mg to complete 10 days.   COVID-19 pneumonia: stable, no hypoxia. He will benefit from steroid therapy given for his facial palsy.   Right side Mastoiditis: follow up with ENT, On Unasyn, discharge on Augmentin to complete 2 weeks.     MYESHA: resolved, resume Losartan and HCTZ.     Ok for discharge today
Patient examined this afternoon and complete right facial palsy is present in addition to left eyelid ptosis.  MRI brain unrevealing.  The displacement of left oculomotor nerve by PCA is of doubtful significance.  I suspect ptosis is Covid related.  No evident fatiguability though will need to f/u on myasthenic ab's that were sent.  Continue full treatment of bells and abx as directed by ENT for otomastoiditis.
# Right-sided facial paralysis possibly secondary to subacute mastoiditis  - hemodynamically stable  - tenderness on the right mastoid bone improved   - CT head 12/20/2020: no acute intracranial pathology  - MR 12/21/2020: no acute intracranial pathology; extensive right mastoid effusions, correlate for otomastoiditis  - c/w valacyclovir   - f/u MG ab  - ENT following: f/u CT maxillofacial with IV contrast  - f/u neurology recommendations     # Left-sided ptosis, possibly secondary to oculomotor nerve compression  - MR 12/21/2020: left oculomotor nerve is noted to be mildly displaced inferiorly by the left PCA and abuts the inferior aspect of the left PCA, possibly evident for neurovascular compression  - check ESR, CRP, HEATHER, DSDNA  - f/u neurosurgery recommendations     # Asymptomatic COVID-19 pneumonia  - 97% on room air  - f/u inflam markers  - symptomatic treatment     #Acute kidney injury on CKD stage 3  - resolved  - Cr. 1.4 (baseline 1.2)  - IVF d/oneyda    # Hypertension  - bp slightly elevated    - c/w metoprolol and nifedipine  - restart losartan-hydrochlorothiazide    # DM  - monitor FS  - c/w sliding scale insulin  - check HbA1C    # CAD s/p CABG x3  - c/w ASA, metoprolol and atorvastatin     # DASH diet    # Out of bed to chair  - fall precaution     # DVT PPx  - start Lovenox 40mg q24    # Full code

## 2020-12-24 NOTE — DISCHARGE NOTE PROVIDER - PROVIDER TOKENS
PROVIDER:[TOKEN:[[id number]],FOLLOWUP:[2 weeks]],PROVIDER:[TOKEN:[[id number]],FOLLOWUP:[2 weeks]]

## 2020-12-24 NOTE — DISCHARGE NOTE PROVIDER - INSTRUCTIONS
Eat more vegetables and fruits.  Swap refined grains for whole grains.  Choose fat-free or low-fat dairy products.  Choose lean protein sources like fish, poultry and beans.  Cook with vegetable oils.  Limit your intake of foods high in added sugars, like soda and candy.

## 2020-12-24 NOTE — PROGRESS NOTE ADULT - SUBJECTIVE AND OBJECTIVE BOX
YAHIR OSPINA 70y Male  MRN#: [id number]   Hospital Day: 4d    SUBJECTIVE  Patient is a 70y old Male who presents with a chief complaint of right sided facial paralysis (23 Dec 2020 11:32)  Currently admitted to medicine with the primary diagnosis of Facial droop      INTERVAL HPI AND OVERNIGHT EVENTS:  Patient was examined and seen at bedside. This morning he is resting comfortably in bed and reports no issues or overnight events.    REVIEW OF SYMPTOMS:  CONSTITUTIONAL: No weakness, fevers or chills; No headaches  EYES: No visual changes, eye pain, or discharge  ENT: No vertigo; No ear pain or change in hearing; No sore throat or difficulty swallowing  NECK: No pain or stiffness  RESPIRATORY: No cough, wheezing, or hemoptysis; No shortness of breath  CARDIOVASCULAR: No chest pain or palpitations  GASTROINTESTINAL: No abdominal or epigastric pain; No nausea, vomiting, or hematemesis; No diarrhea or constipation; No melena or hematochezia  GENITOURINARY: No dysuria, frequency or hematuria  MUSCULOSKELETAL: No joint pain, no muscle pain, no weakness  NEUROLOGICAL: No numbness or weakness  SKIN: No itching or rashes    OBJECTIVE  PAST MEDICAL & SURGICAL HISTORY  CAD (coronary artery disease)  s/p CABG X3    Hypercholesteremia    Diabetes    HTN (hypertension)    S/P CABG (coronary artery bypass graft)      ALLERGIES:  No Known Allergies    MEDICATIONS:  STANDING MEDICATIONS  ampicillin/sulbactam  IVPB 3 Gram(s) IV Intermittent every 6 hours  artificial  tears Solution 1 Drop(s) Both EYES daily  aspirin enteric coated 81 milliGRAM(s) Oral daily  atorvastatin 40 milliGRAM(s) Oral at bedtime  dexAMETHasone  Injectable 6 milliGRAM(s) IV Push every 8 hours  enoxaparin Injectable 40 milliGRAM(s) SubCutaneous daily  insulin glargine Injectable (LANTUS) 12 Unit(s) SubCutaneous at bedtime  insulin lispro (ADMELOG) corrective regimen sliding scale   SubCutaneous three times a day before meals  insulin lispro Injectable (ADMELOG) 5 Unit(s) SubCutaneous three times a day before meals  LORazepam   Injectable 2 milliGRAM(s) IV Push once  losartan 100 milliGRAM(s) Oral daily  metoprolol tartrate 50 milliGRAM(s) Oral two times a day  NIFEdipine XL 30 milliGRAM(s) Oral daily  pantoprazole    Tablet 40 milliGRAM(s) Oral before breakfast  petrolatum Ophthalmic Ointment 1 Application(s) Both EYES at bedtime  valACYclovir 500 milliGRAM(s) Oral daily    PRN MEDICATIONS      VITAL SIGNS: Last 24 Hours  T(C): 36.1 (24 Dec 2020 05:39), Max: 36.1 (24 Dec 2020 05:39)  T(F): 96.9 (24 Dec 2020 05:39), Max: 96.9 (24 Dec 2020 05:39)  HR: 62 (24 Dec 2020 05:39) (62 - 77)  BP: 134/69 (24 Dec 2020 05:39) (134/69 - 146/67)  BP(mean): --  RR: 17 (24 Dec 2020 05:39) (17 - 18)  SpO2: 97% (23 Dec 2020 19:13) (97% - 97%)    LABS:                        13.4   8.28  )-----------( 224      ( 23 Dec 2020 09:17 )             42.4     12-23    137  |  101  |  23<H>  ----------------------------<  316<H>  4.9   |  24  |  1.2    Ca    9.3      23 Dec 2020 09:17  Mg     2.1     12-23                Culture - Other (collected 21 Dec 2020 14:29)  Source: .Other R ear culture  Final Report (23 Dec 2020 17:34):    Numerous Staphylococcus aureus  Organism: Staphylococcus aureus (23 Dec 2020 17:34)  Organism: Staphylococcus aureus (23 Dec 2020 17:34)          RADIOLOGY:      PHYSICAL EXAM:  CONSTITUTIONAL: No acute distress, well-developed, well-groomed, AAOx3  HEAD: Atraumatic, normocephalic  EYES: EOM intact, PERRLA, conjunctiva and sclera clear  ENT: Supple, no masses, no thyromegaly, no bruits, no JVD; moist mucous membranes  PULMONARY: Clear to auscultation bilaterally; no wheezes, rales, or rhonchi  CARDIOVASCULAR: Regular rate and rhythm; no murmurs, rubs, or gallops  GASTROINTESTINAL: Soft, non-tender, non-distended; bowel sounds present  MUSCULOSKELETAL: 2+ peripheral pulses; no clubbing, no cyanosis, no edema  NEUROLOGY: Left ptosis and right facial paralysis  SKIN: No rashes or lesions; warm and dry    ASSESSMENT & PLAN:  Patient is a 71yo male with PMH of CAD s/p CABG, diabetes mellitus, hypertension, hyperlipidemia, and epistaxis who presented to the ED complaining of right-sided facial droop and left eye droop starting the evening of presentation.    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 No evidence of carotid or vertebral artery stenosis  - MR IAC 12/21/2020: no acute intracranial pathology; extensive right mastoid effusions, correlate for otomastoiditis; The left oculomotor nerve is noted to be mildly displaced inferiorly by the left PCA and abuts the inferior aspect of the left PCA, possibly evident for neurovascular compression  - CT maxillofacial with IV contrast 12/22/2020: near complete opacification of right mastoid air cells without erosion of right temporal bone  - Neurology consult appreciated  - ENT consult appreciated: start Unasyn 3g IV Q6H and dexamethasone 6mg IV Q8H  - Continue with valacyclovir 500mg PO once daily   - Follow myasthenia gravis antibodies    #Left-sided ptosis, 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 No evidence of carotid or vertebral artery stenosis  - MR IAC 12/21/2020: no acute intracranial pathology; extensive right mastoid effusions, correlate for otomastoiditis; The left oculomotor nerve is noted to be mildly displaced inferiorly by the left PCA and abuts the inferior aspect of the left PCA, possibly evident for neurovascular compression  - ESR 54, CRP 0.85  - Follow HEATHER and DsDNA  - Neurosurgery consult pending    #COVID-19 pneumonia, asymptomatic  - COVID-19 PCR positive  - Isolation precautions (contact, droplet, airborne)  - Chest X-ray: no acute cardiopulmonary process  - Patient is saturating 96% on room air  - C-Reactive Protein, Serum: 0.85 mg/dL (12-21-20 @ 05:35)  - D-Dimer Assay, Quantitative: 148 ng/mL DDU (12-21-20 @ 05:35)  - Procalcitonin, Serum: 0.08 ng/mL (12-21-20 @ 05:35)  - Repeat inflammatory markers (D-dimer, CRP, ferritin) Q48-72H if clinically worsening  - ID consult appreciated: no indications for treatment of COVID-19 at this time  - DVT prophylaxis per protocol  - Titrate supplemental O2 to maintain SpO2 92-96%    #Acute kidney injury on CKD stage 3, improving  - Possibly pre-renal vs ATN  - Baseline creatinine: 13 (10/17/2018)  - Creatinine: 2.0->1.5->1.4  - Avoid nephrotoxic agents  - Monitor BUN/creatinine     #CAD s/p CABG x3  - Continue with aspirin 81mg PO once daily, atorvastatin 40mg PO at bedtime, metoprolol tartrate 50mg PO twice daily    #Diabetes mellitus type 2  - Hemoglobin A1c in AM  - Hold home oral medications  - Start insulin glargine 12 units SQ QHS  - Start insulin lispro 5 units SQ TID  - Insulin sliding scale coverage  - Monitor fingerstick glucose, especially as patient will be on IV steroids    #Hypertension  - Continue with metoprolol tartrate 50mg PO twice daily and nifedipine 30mg PO once daily  - Restart losartan 100mg PO once daily but holding hydrochlorothiazide for now    #Hyperlipidemia  - Continue with atorvastatin 40mg PO at bedtime  - Follow lipid profile    #Misc  - DVT Prophylaxis: heparin 5000 units SQ Q8H   - GI Prophylaxis: pantoprazole 40mg PO once daily   - Diet: DASH/TLC  - Activity: ambulate as tolerated  - IV Fluids: not indicated   - Code Status: Full Code    Dispo: acute  YAHIR OSPINA 70y Male  MRN#: [id number]   Hospital Day: 4d    SUBJECTIVE  Patient is a 70y old Male who presents with a chief complaint of right sided facial paralysis (23 Dec 2020 11:32)  Currently admitted to medicine with the primary diagnosis of Facial droop      INTERVAL HPI AND OVERNIGHT EVENTS:  Patient was examined and seen at bedside. This morning he is resting comfortably in bed and reports no issues or overnight events.    REVIEW OF SYMPTOMS:  CONSTITUTIONAL: No weakness, fevers or chills; No headaches  EYES: No visual changes, eye pain, or discharge  ENT: No vertigo; No ear pain or change in hearing; No sore throat or difficulty swallowing  NECK: No pain or stiffness  RESPIRATORY: No cough, wheezing, or hemoptysis; No shortness of breath  CARDIOVASCULAR: No chest pain or palpitations  GASTROINTESTINAL: No abdominal or epigastric pain; No nausea, vomiting, or hematemesis; No diarrhea or constipation; No melena or hematochezia  GENITOURINARY: No dysuria, frequency or hematuria  MUSCULOSKELETAL: No joint pain, no muscle pain, no weakness  NEUROLOGICAL: No numbness or weakness  SKIN: No itching or rashes    OBJECTIVE  PAST MEDICAL & SURGICAL HISTORY  CAD (coronary artery disease)  s/p CABG X3    Hypercholesteremia    Diabetes    HTN (hypertension)    S/P CABG (coronary artery bypass graft)      ALLERGIES:  No Known Allergies    MEDICATIONS:  STANDING MEDICATIONS  ampicillin/sulbactam  IVPB 3 Gram(s) IV Intermittent every 6 hours  artificial  tears Solution 1 Drop(s) Both EYES daily  aspirin enteric coated 81 milliGRAM(s) Oral daily  atorvastatin 40 milliGRAM(s) Oral at bedtime  dexAMETHasone  Injectable 6 milliGRAM(s) IV Push every 8 hours  enoxaparin Injectable 40 milliGRAM(s) SubCutaneous daily  insulin glargine Injectable (LANTUS) 12 Unit(s) SubCutaneous at bedtime  insulin lispro (ADMELOG) corrective regimen sliding scale   SubCutaneous three times a day before meals  insulin lispro Injectable (ADMELOG) 5 Unit(s) SubCutaneous three times a day before meals  LORazepam   Injectable 2 milliGRAM(s) IV Push once  losartan 100 milliGRAM(s) Oral daily  metoprolol tartrate 50 milliGRAM(s) Oral two times a day  NIFEdipine XL 30 milliGRAM(s) Oral daily  pantoprazole    Tablet 40 milliGRAM(s) Oral before breakfast  petrolatum Ophthalmic Ointment 1 Application(s) Both EYES at bedtime  valACYclovir 500 milliGRAM(s) Oral daily    PRN MEDICATIONS      VITAL SIGNS: Last 24 Hours  T(C): 36.1 (24 Dec 2020 05:39), Max: 36.1 (24 Dec 2020 05:39)  T(F): 96.9 (24 Dec 2020 05:39), Max: 96.9 (24 Dec 2020 05:39)  HR: 62 (24 Dec 2020 05:39) (62 - 77)  BP: 134/69 (24 Dec 2020 05:39) (134/69 - 146/67)  BP(mean): --  RR: 17 (24 Dec 2020 05:39) (17 - 18)  SpO2: 97% (23 Dec 2020 19:13) (97% - 97%)    LABS:                        13.4   8.28  )-----------( 224      ( 23 Dec 2020 09:17 )             42.4     12-23    137  |  101  |  23<H>  ----------------------------<  316<H>  4.9   |  24  |  1.2    Ca    9.3      23 Dec 2020 09:17  Mg     2.1     12-23    Culture - Other (collected 21 Dec 2020 14:29)  Source: .Other R ear culture  Final Report (23 Dec 2020 17:34):    Numerous Staphylococcus aureus  Organism: Staphylococcus aureus (23 Dec 2020 17:34)  Organism: Staphylococcus aureus (23 Dec 2020 17:34)    RADIOLOGY:  < from: CT Maxillofacial w/ IV Cont (12.22.20 @ 20:37) >  IMPRESSION:    Near complete opacification of right mastoid air cells, without definite osseous erosion of the right temporal bone. No adjacent pneumocephalus visualized.    < end of copied text >      PHYSICAL EXAM:  CONSTITUTIONAL: No acute distress, well-developed, well-groomed, AAOx3  HEAD: Atraumatic, normocephalic  EYES: EOM intact, PERRLA, conjunctiva and sclera clear  ENT: Supple, no masses, no thyromegaly, no bruits, no JVD; moist mucous membranes  PULMONARY: Clear to auscultation bilaterally; no wheezes, rales, or rhonchi  CARDIOVASCULAR: Regular rate and rhythm; no murmurs, rubs, or gallops  GASTROINTESTINAL: Soft, non-tender, non-distended; bowel sounds present  MUSCULOSKELETAL: 2+ peripheral pulses; no clubbing, no cyanosis, no edema  NEUROLOGY: Left ptosis and right facial paralysis  SKIN: No rashes or lesions; warm and dry    ASSESSMENT & PLAN:  Patient is a 71yo male with PMH of CAD s/p CABG, diabetes mellitus, hypertension, hyperlipidemia, and epistaxis who presented to the ED complaining of right-sided facial droop and left eye droop starting the evening of presentation.    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 No evidence of carotid or vertebral artery stenosis  - MR IAC 12/21/2020: no acute intracranial pathology; extensive right mastoid effusions, correlate for otomastoiditis; The left oculomotor nerve is noted to be mildly displaced inferiorly by the left PCA and abuts the inferior aspect of the left PCA, possibly evident for neurovascular compression  - CT maxillofacial with IV contrast 12/22/2020: near complete opacification of right mastoid air cells without erosion of right temporal bone  - Neurology consult appreciated: patient medically clear for discharge; follow up in 2-3 weeks outpatient  - ID consult appreciated: can switch patient to Augmentin PO for total of 14 days and valacyclovir for total of 7 days  - ENT consult appreciated: start Unasyn 3g IV Q6H and dexamethasone 6mg IV Q8H  - Follow myasthenia gravis antibodies    #Left-sided ptosis, 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 No evidence of carotid or vertebral artery stenosis  - MR IAC 12/21/2020: no acute intracranial pathology; extensive right mastoid effusions, correlate for otomastoiditis; The left oculomotor nerve is noted to be mildly displaced inferiorly by the left PCA and abuts the inferior aspect of the left PCA, possibly evident for neurovascular compression  - ESR 54, CRP 0.85  - Follow HEATHER and DsDNA  - Neurosurgery consult appreciated    #COVID-19 pneumonia, asymptomatic  - COVID-19 PCR positive  - Isolation precautions (contact, droplet, airborne)  - Chest X-ray: no acute cardiopulmonary process  - Patient is saturating 96% on room air  - C-Reactive Protein, Serum: 0.85 mg/dL (12-21-20 @ 05:35)  - D-Dimer Assay, Quantitative: 148 ng/mL DDU (12-21-20 @ 05:35)  - Procalcitonin, Serum: 0.08 ng/mL (12-21-20 @ 05:35)  - Repeat inflammatory markers (D-dimer, CRP, ferritin) Q48-72H if clinically worsening  - ID consult appreciated: no indications for treatment of COVID-19 at this time  - DVT prophylaxis per protocol  - Titrate supplemental O2 to maintain SpO2 92-96%    #Acute kidney injury on CKD stage 3, improving  - Possibly pre-renal vs ATN  - Baseline creatinine: 13 (10/17/2018)  - Creatinine: 2.0->1.5->1.4->1.3  - Avoid nephrotoxic agents  - Monitor BUN/creatinine     #CAD s/p CABG x3  - Continue with aspirin 81mg PO once daily, atorvastatin 40mg PO at bedtime, metoprolol tartrate 50mg PO twice daily    #Diabetes mellitus type 2  - Hemoglobin A1c in AM  - Hold home oral medications  - Continue with insulin glargine 12 units SQ QHS  - Continue with insulin lispro 5 units SQ TID  - Insulin sliding scale coverage  - Monitor fingerstick glucose, especially as patient will be on IV steroids    #Hypertension  - Continue with metoprolol tartrate 50mg PO twice daily and nifedipine 30mg PO once daily  - Continue with losartan 100mg PO once daily but holding hydrochlorothiazide for now    #Hyperlipidemia  - Continue with atorvastatin 40mg PO at bedtime  - Follow lipid profile    #Misc  - DVT Prophylaxis: heparin 5000 units SQ Q8H   - GI Prophylaxis: pantoprazole 40mg PO once daily   - Diet: DASH/TLC  - Activity: ambulate as tolerated  - IV Fluids: not indicated   - Code Status: Full Code    Dispo: patient can be discharged today

## 2020-12-24 NOTE — PROGRESS NOTE ADULT - ASSESSMENT
Pt is a 70y Male admitted with R sided facial droop, mastoiditis - stable, improvement in ear pain.    ·	cont IV abx -> can switch to PO upon D/C home  ·	cont IV decadron -> switch to PO taper upon D/C home   ·	cont abx ear drops for 5 more days, can switch to floxin otic 5 drops to R ear q12h  ·	cont care per medicine re: COVID status  ·	pt will need to follow up as OP to monitor FN & need for intervention, appt made for patient to see Dr Nelson @ [facility] [place], January 7th @ 10:45 AM, [phone]  ·	w/d with attng

## 2020-12-24 NOTE — DISCHARGE NOTE PROVIDER - HOSPITAL COURSE
Patient is a 69yo male with PMH of CAD s/p CABG, diabetes mellitus, hypertension, hyperlipidemia, and epistaxis who presented to the ED complaining of right-sided facial droop and left eye ptosis starting the evening of presentation. CT head revealed otomastoiditis

## 2020-12-24 NOTE — PROGRESS NOTE ADULT - SUBJECTIVE AND OBJECTIVE BOX
ENT DAILY PROGRESS NOTE    Pt is a 70y Male admitted with R sided facial droop, mastoiditis - seen and examined at bedside. Pt doing well, has no complaints this AM. Pt states he feels overall much better in terms of his ear pain, no longer having any drainage. Pt has not noticed any change in his facial droop.       REVIEW OF SYSTEMS   [x] A ten-point review of systems was otherwise negative except as noted.  [ ] Due to altered mental status/intubation, subjective information were not able to be obtained from patient. History was obtained, to the extent possible, from review of the chart and collateral sources of information.    Allergies    No Known Allergies    Intolerances    MEDICATIONS:  artificial  tears Solution 1 Drop(s) Both EYES daily  aspirin enteric coated 81 milliGRAM(s) Oral daily  atorvastatin 40 milliGRAM(s) Oral at bedtime  dexAMETHasone  Injectable 6 milliGRAM(s) IV Push every 8 hours  enoxaparin Injectable 40 milliGRAM(s) SubCutaneous daily  insulin glargine Injectable (LANTUS) 12 Unit(s) SubCutaneous at bedtime  insulin lispro (ADMELOG) corrective regimen sliding scale   SubCutaneous three times a day before meals  insulin lispro Injectable (ADMELOG) 5 Unit(s) SubCutaneous three times a day before meals  LORazepam   Injectable 2 milliGRAM(s) IV Push once  losartan 100 milliGRAM(s) Oral daily  metoprolol tartrate 50 milliGRAM(s) Oral two times a day  NIFEdipine XL 30 milliGRAM(s) Oral daily  pantoprazole    Tablet 40 milliGRAM(s) Oral before breakfast  petrolatum Ophthalmic Ointment 1 Application(s) Both EYES at bedtime  valACYclovir 500 milliGRAM(s) Oral daily      Vital Signs Last 24 Hrs  T(C): 36.2 (24 Dec 2020 07:20), Max: 36.2 (24 Dec 2020 07:20)  T(F): 97.2 (24 Dec 2020 07:20), Max: 97.2 (24 Dec 2020 07:20)  HR: 56 (24 Dec 2020 07:20) (56 - 77)  BP: 156/71 (24 Dec 2020 07:20) (134/69 - 156/71)  RR: 18 (24 Dec 2020 07:20) (17 - 18)  SpO2: 95% (24 Dec 2020 07:20) (95% - 97%)      12-23 @ 07:01  -  12-24 @ 07:00  --------------------------------------------------------  IN:    IV PiggyBack: 100 mL    Oral Fluid: 120 mL  Total IN: 220 mL    OUT:  Total OUT: 0 mL    Total NET: 220 mL      PHYSICAL EXAM:    GEN: Well-developed, well-nourished. NAD, awake and alert. No drooling or pooling of secretions. No stridor or stertor. Good vocal quality, no hoarseness.   SKIN: Good color, non diaphoretic  HEENT: + R facial droop - able to close eyelid, not able to keep it closed. no eyebrow raise, no lip movement. Oral mucosa pink and moist. No erythema or edema noted to buccal mucosa, tongue, FOM, uvula or posterior oropharynx. Uvula midline  EAR: no TTP to b/l pre/post auricular, mastoid and tragal area. R EAC WNL, no drainage noted, + small TM perforation noted centrally. L TM intact.   NECK:  Trachea midline. Neck supple, no TTP to B/L lateral neck, no cervical LAD.  RESP: No dyspnea, non-labored breathing. No use of accessory muscles.  CARDIO: +S1/S2  ABDO: Soft, NT.  EXT: HAZEL x 4    LABS:  CBC-                        13.1   10.81 )-----------( 217      ( 24 Dec 2020 07:15 )             41.3     BMP/CMP-  24 Dec 2020 07:15    142    |  105    |  28     ----------------------------<  224    4.4     |  25     |  1.3      Ca    9.0        24 Dec 2020 07:15  Mg     2.2       24 Dec 2020 07:15      Coagulation Studies-    Endocrine Panel-  Calcium, Total Serum: 9.0 mg/dL (12-24 @ 07:15)      RADIOLOGY & ADDITIONAL STUDIES:

## 2020-12-24 NOTE — DISCHARGE NOTE PROVIDER - CARE PROVIDER_API CALL
Tyree Nelson)  Otolaryngology  [address], [address] [place] [address]  Phone: [phone]  Fax: [phone]  Follow Up Time: 2 weeks    Daniel Alonso  NEUROLOGY  [address], [address]  [place] [address]  Phone: [phone]  Fax: [phone]  Follow Up Time: 2 weeks

## 2020-12-24 NOTE — PROGRESS NOTE ADULT - REASON FOR ADMISSION
right sided facial paralysis

## 2020-12-24 NOTE — DISCHARGE NOTE NURSING/CASE MANAGEMENT/SOCIAL WORK - PATIENT PORTAL LINK FT
You can access the FollowMyHealth Patient Portal offered by [facility] by registering at the following website: [email or web address]. By joining [facility]’s FollowMyHealth portal, you will also be able to view your health information using other applications (apps) compatible with our system.

## 2020-12-24 NOTE — DISCHARGE NOTE PROVIDER - NSDCCPCAREPLAN_GEN_ALL_CORE_FT
PRINCIPAL DISCHARGE DIAGNOSIS  Diagnosis: Acute mastoiditis with facial paralysis, right  Assessment and Plan of Treatment: You presented to the hospital complaining of right facial droop. You were found to have acute mastoiditis of the right side. You were given IV antibiotics and steroids for the infection. You were evaluated by the neurology team, infectious disease team, and ENT team. You will be discharged on three medications. Please take Augmentin 875-125mg twice daily for 10 more days. Please take prednisone 40mg once daily for 6 more days. Please take valacyclovir 500mg for 2 more days. You have a follow up appointment scheduled with Dr. Nelson on January 7th, 2021 at 10:45am. You will also be given the information for the neurologist. Please call the office to make an appointment in 2-3 weeks.      SECONDARY DISCHARGE DIAGNOSES  Diagnosis: MYESHA (acute kidney injury)  Assessment and Plan of Treatment: You were noted to have a temporary insult to your kidney function either at the time that you arrived at the hospital or during your stay here. We have monitored your kidney function with blood work during your time here and you are at a level that no longer requires continued hospital level care. We do recommend that you follow up to continually have your kidney function checked. You can follow up with your primary care doctor.    Diagnosis: COVID-19  Assessment and Plan of Treatment: Coronavirus disease 2019 (COVID-19) is a respiratory illness  that can spread from person to person. The virus that causes  COVID-19 is a novel coronavirus that was first identified during  an investigation into an outbreak in [place], [place].  The virus that causes COVID-19 probably emerged from an  animal source, but is now spreading from person to person.  The virus is thought to spread mainly between people who  are in close contact with one another (within about 6 feet)  through respiratory droplets produced when an infected  person coughs or sneezes. It also may be possible that a person  can get COVID-19 by touching a surface or object that has  the virus on it and then touching their own mouth, nose, or  possibly their eyes, but this is not thought to be the main  way the virus spreads.  Please stay home and avoid contact with others for at least a week after symptoms resolve and follow government guidelines.   Patients with COVID-19 have had mild to severe respiratory  illness with symptoms of  • fever  • cough  • shortness of breath  People can help protect themselves from respiratory illness with  everyday preventive actions.    • Avoid close contact with people who are sick.  • Avoid touching your eyes, nose, and mouth with  unwashed hands.  • Wash your hands often with soap and water for at least 20   seconds. Use an alcohol-based hand  that contains at  least 60% alcohol if soap and water are not available.   Stay home when you are sick.  • Cover your cough or sneeze with a tissue, then throw the  tissue in the trash.  • Clean and disinfect frequently touched objects  and surfaces.  Call 911 and inform them you are covid positive before you decide to go to the emergency room if you have chest pain, difficulty breathing, high fevers, worsening of your symptoms, feel unwell, or have nausea and vomiting.    Diagnosis: Ptosis  Assessment and Plan of Treatment: You came to the hospital for left eyelid droop. You were evaluated by the neurology department, who have placed you on a course of Valtrex (valacyclovir) and steroids. Please follow up with the neurology team in 2-3 weeks for further workup.   Please take Valtrex (valacyclovir) 500mg once daily for 2 more days. Please take prednisone 40mg one daily for 6 more days.

## 2020-12-26 ENCOUNTER — TRANSCRIPTION ENCOUNTER (OUTPATIENT)
Age: 70
End: 2020-12-26

## 2020-12-27 LAB
IL17A SERPL-MCNC: <1.4 PG/ML — SIGNIFICANT CHANGE UP
INTERFERON GAMMA, BLOOD: <4.2 PG/ML — SIGNIFICANT CHANGE UP

## 2020-12-28 ENCOUNTER — TRANSCRIPTION ENCOUNTER (OUTPATIENT)
Age: 70
End: 2020-12-28

## 2020-12-28 LAB — IL2 FLD-MCNC: <31.2 PG/ML — SIGNIFICANT CHANGE UP (ref 0–31.2)

## 2020-12-29 DIAGNOSIS — Z95.1 PRESENCE OF AORTOCORONARY BYPASS GRAFT: ICD-10-CM

## 2020-12-29 DIAGNOSIS — Z79.84 LONG TERM (CURRENT) USE OF ORAL HYPOGLYCEMIC DRUGS: ICD-10-CM

## 2020-12-29 DIAGNOSIS — E11.65 TYPE 2 DIABETES MELLITUS WITH HYPERGLYCEMIA: ICD-10-CM

## 2020-12-29 DIAGNOSIS — U07.1 COVID-19: ICD-10-CM

## 2020-12-29 DIAGNOSIS — H66.91 OTITIS MEDIA, UNSPECIFIED, RIGHT EAR: ICD-10-CM

## 2020-12-29 DIAGNOSIS — H70.091 ACUTE MASTOIDITIS WITH OTHER COMPLICATIONS, RIGHT EAR: ICD-10-CM

## 2020-12-29 DIAGNOSIS — N18.30 CHRONIC KIDNEY DISEASE, STAGE 3 UNSPECIFIED: ICD-10-CM

## 2020-12-29 DIAGNOSIS — I25.10 ATHEROSCLEROTIC HEART DISEASE OF NATIVE CORONARY ARTERY WITHOUT ANGINA PECTORIS: ICD-10-CM

## 2020-12-29 DIAGNOSIS — Z79.82 LONG TERM (CURRENT) USE OF ASPIRIN: ICD-10-CM

## 2020-12-29 DIAGNOSIS — N17.9 ACUTE KIDNEY FAILURE, UNSPECIFIED: ICD-10-CM

## 2020-12-29 DIAGNOSIS — H02.402 UNSPECIFIED PTOSIS OF LEFT EYELID: ICD-10-CM

## 2020-12-29 DIAGNOSIS — E11.22 TYPE 2 DIABETES MELLITUS WITH DIABETIC CHRONIC KIDNEY DISEASE: ICD-10-CM

## 2020-12-29 DIAGNOSIS — G51.0 BELL'S PALSY: ICD-10-CM

## 2020-12-29 DIAGNOSIS — E66.09 OTHER OBESITY DUE TO EXCESS CALORIES: ICD-10-CM

## 2020-12-29 DIAGNOSIS — J12.89 OTHER VIRAL PNEUMONIA: ICD-10-CM

## 2020-12-29 DIAGNOSIS — I12.9 HYPERTENSIVE CHRONIC KIDNEY DISEASE WITH STAGE 1 THROUGH STAGE 4 CHRONIC KIDNEY DISEASE, OR UNSPECIFIED CHRONIC KIDNEY DISEASE: ICD-10-CM

## 2020-12-29 LAB
IL1 SERPL-MCNC: < 3.9 PG/ML — SIGNIFICANT CHANGE UP
IL6 FLD-MCNC: <2.5 PG/ML — SIGNIFICANT CHANGE UP (ref 0–13)

## 2020-12-30 LAB — A-TUMOR NECROSIS FACT SERPL-MCNC: 0.6 PG/ML — SIGNIFICANT CHANGE UP (ref 0–2.2)

## 2020-12-31 LAB — MUSK IGG SER IA-MCNC: <1 U/ML — SIGNIFICANT CHANGE UP

## 2021-01-04 LAB — IL10 FLD-MCNC: 0.5 PG/ML — SIGNIFICANT CHANGE UP

## 2021-01-07 ENCOUNTER — APPOINTMENT (OUTPATIENT)
Dept: NEUROLOGY | Facility: CLINIC | Age: 71
End: 2021-01-07
Payer: MEDICARE

## 2021-01-07 ENCOUNTER — APPOINTMENT (OUTPATIENT)
Dept: OTOLARYNGOLOGY | Facility: CLINIC | Age: 71
End: 2021-01-07
Payer: MEDICARE

## 2021-01-07 VITALS
WEIGHT: 200 LBS | HEART RATE: 66 BPM | DIASTOLIC BLOOD PRESSURE: 65 MMHG | SYSTOLIC BLOOD PRESSURE: 109 MMHG | HEIGHT: 66 IN | TEMPERATURE: 97.2 F | OXYGEN SATURATION: 98 % | BODY MASS INDEX: 32.14 KG/M2

## 2021-01-07 VITALS — TEMPERATURE: 98 F

## 2021-01-07 DIAGNOSIS — I25.10 ATHEROSCLEROTIC HEART DISEASE OF NATIVE CORONARY ARTERY W/OUT ANGINA PECTORIS: ICD-10-CM

## 2021-01-07 DIAGNOSIS — E78.5 HYPERLIPIDEMIA, UNSPECIFIED: ICD-10-CM

## 2021-01-07 DIAGNOSIS — J45.909 UNSPECIFIED ASTHMA, UNCOMPLICATED: ICD-10-CM

## 2021-01-07 PROCEDURE — 99214 OFFICE O/P EST MOD 30 MIN: CPT

## 2021-01-07 PROCEDURE — 92550 TYMPANOMETRY & REFLEX THRESH: CPT

## 2021-01-07 PROCEDURE — 31231 NASAL ENDOSCOPY DX: CPT

## 2021-01-07 PROCEDURE — 99213 OFFICE O/P EST LOW 20 MIN: CPT | Mod: 25

## 2021-01-07 PROCEDURE — 92557 COMPREHENSIVE HEARING TEST: CPT

## 2021-01-07 RX ORDER — LOSARTAN POTASSIUM 100 MG/1
TABLET, FILM COATED ORAL
Refills: 0 | Status: ACTIVE | COMMUNITY

## 2021-01-07 RX ORDER — NIFEDIPINE 20 MG/1
CAPSULE ORAL
Refills: 0 | Status: ACTIVE | COMMUNITY

## 2021-01-07 RX ORDER — METOPROLOL TARTRATE 75 MG/1
TABLET, FILM COATED ORAL
Refills: 0 | Status: ACTIVE | COMMUNITY

## 2021-01-07 RX ORDER — ROSUVASTATIN CALCIUM 5 MG/1
TABLET, FILM COATED ORAL
Refills: 0 | Status: ACTIVE | COMMUNITY

## 2021-01-07 RX ORDER — METFORMIN HYDROCHLORIDE 625 MG/1
TABLET ORAL
Refills: 0 | Status: ACTIVE | COMMUNITY

## 2021-01-07 NOTE — DATA REVIEWED
[de-identified] :  reviewed by me. right greater than left mixed hearing loss \par  [de-identified] : \par \par \par EXAM: CT MAXILLOFACIAL IC\par \par \par PROCEDURE DATE: 12/22/2020\par \par \par \par \par INTERPRETATION: CLINICAL HISTORY / REASON FOR EXAM: Right facial paralysis with left eye ptosis\par \par TECHNIQUE: Multiple contiguous CT axial images of the temporal bones with coronal and sagittal reformats submitted for review.\par \par COMPARISON: None available\par \par FINDINGS:\par \par Near complete opacification of right mastoid air cells, without definite osseous erosion of the right temporal bone. No adjacent pneumocephalus visualized.\par \par The rest of the visualized paranasal sinuses and left mastoid air cells are well aerated.\par \par No acutely displaced fractures or dislocation.\par \par Visualized brain parenchyma is otherwise unremarkable. Bilateral orbits are unremarkable.\par \par IMPRESSION:\par \par Near complete opacification of right mastoid air cells, without definite osseous erosion of the right temporal bone. No adjacent pneumocephalus visualized.\par \par \par \par \par \par GINO OSPINA M.D., RESIDENT RADIOLOGIST\par This document has been electronically signed.\par LEILA TURCIOS MD; Attending Interventional Radiologist\par This document has been electronically signed. Dec 22 2020 10:19PM

## 2021-01-07 NOTE — PROCEDURE
[Epistaxis] : evaluation of epistaxis [None] : none [Flexible Endoscope] : examined with the flexible endoscope [Nasal Septum Mucosa Bleeding Right] : bleeding on the right [Normal] : the paranasal sinuses had no abnormalities [Other ___] : [unfilled] [Same] : same as the Pre Op Dx. [] : Binocular Microscopy [FreeTextEntry4] : none  [FreeTextEntry6] : 10% anterior perforation

## 2021-01-07 NOTE — PHYSICAL EXAM
[FreeTextEntry1] : Severe right 7th nerve palsy.  Has ability though to close right eye, mimimal forehead movement on right.  No movement of lower half of face on right.\par Pupils are 3.5 mm bilaterally and equally reactive to light.  EOM's are full.\par No pronator drift.\par \par

## 2021-01-07 NOTE — ASSESSMENT
[FreeTextEntry1] : TM perforation-\par -observation\par - if not healed then tm repair in office\par \par Epistaxis-\par -education provided

## 2021-01-07 NOTE — ASSESSMENT
[FreeTextEntry1] : Right 7th nerve palsy.  He was treated with valacyclovir and steroids.  Antibiotics were given by ENT.\par He is only a couple of weeks out however.  I discussed prognosis over many months.  Encourage tight regulation of blood sugar.  Advised patient to apply ophthalmic ointment every night to lower risk for corneal ulcer. \par Return in 3-4 months.

## 2021-01-07 NOTE — HISTORY OF PRESENT ILLNESS
[FreeTextEntry1] : Pt presents today in f/u for right facial droop (peripheral 7th palsy).  No improvement despite completing course of steroids and valtrex and getting treated with antibiotics for otomastoiditis by ENT.  Saw ENT today and told he has tear in right ear drum.  Their diagoses were acute serous otitis media and performation of right typmanic membrane.\par \par MRI brain and IAC's negative though dolichoectasia of left vertebral artery and left PCA was pressing against left oculomotor nerve.\par \par Denies diplopia.  Denies eye dryness.

## 2021-01-07 NOTE — HISTORY OF PRESENT ILLNESS
[de-identified] : Patient presents today c/o , He was in the [facility] December 20 ,2020. Went to the hospital for possible stroke, turned out to be an right ear infection. Was prescribed Augmentin, Valacyclovir and prednisone. . He finish course of antibiotic.  He does feel pressure in right ear.Pt  still has occasional epistaxis on the right.

## 2021-01-07 NOTE — PHYSICAL EXAM
[Normal] : mucosa is normal [Midline] : trachea located in midline position [FreeTextEntry1] : right facial droop  [de-identified] : right tm perf, no discharge

## 2021-01-28 RX ORDER — FLUTICASONE PROPIONATE 50 UG/1
50 SPRAY, METERED NASAL DAILY
Qty: 1 | Refills: 3 | Status: ACTIVE | COMMUNITY
Start: 2021-01-28 | End: 1900-01-01

## 2021-03-25 ENCOUNTER — APPOINTMENT (OUTPATIENT)
Dept: NEUROLOGY | Facility: CLINIC | Age: 71
End: 2021-03-25
Payer: MEDICARE

## 2021-03-25 VITALS
HEIGHT: 66 IN | BODY MASS INDEX: 31.34 KG/M2 | TEMPERATURE: 98 F | SYSTOLIC BLOOD PRESSURE: 131 MMHG | DIASTOLIC BLOOD PRESSURE: 71 MMHG | OXYGEN SATURATION: 95 % | HEART RATE: 65 BPM | WEIGHT: 195 LBS

## 2021-03-25 PROCEDURE — 99212 OFFICE O/P EST SF 10 MIN: CPT

## 2021-03-25 NOTE — ASSESSMENT
[FreeTextEntry1] : Right Bell's palsy, minor improvement since last visit.\par \par Plan:\par 1.  Encouraged patient to continue using nighttime ointment in right eye.\par 2.  Encouraged patient to work with PCP to optimize blood sugar control.\par 3.  Return in 6 months.

## 2021-03-25 NOTE — HISTORY OF PRESENT ILLNESS
[FreeTextEntry1] : Pt presents for f/u of right bell's palsy.  Since last visit (a couple of weeks following onset) he notes mild improvement in ability to close right eye and some movement in right lower face returning.  Still liquids dribble out of mouth.  Chewing is okay but still has difficulty moving food over from right side of mouth to left.  No trouble swallowing.  Feel some slurring if talks too long, similar to before.\par \par Sees ENT for slight tear in eardrum he states, has f/u appt in next week or two.\par \par No new neurologic symptoms.  No loss of taste or smell.\par \par Right eye sometimes malik.  He uses the ointment more prn now.

## 2021-03-25 NOTE — PHYSICAL EXAM
[FreeTextEntry1] : Right facial weakness present.\par Able to close eyes but right side not tightly.\par Weak movement right smile\par Able to wrinkle forehead but weakly on right.\par \par

## 2021-03-27 ENCOUNTER — TRANSCRIPTION ENCOUNTER (OUTPATIENT)
Age: 71
End: 2021-03-27

## 2021-04-08 ENCOUNTER — APPOINTMENT (OUTPATIENT)
Dept: OTOLARYNGOLOGY | Facility: CLINIC | Age: 71
End: 2021-04-08
Payer: MEDICARE

## 2021-04-08 PROCEDURE — 92557 COMPREHENSIVE HEARING TEST: CPT

## 2021-04-08 PROCEDURE — 92550 TYMPANOMETRY & REFLEX THRESH: CPT

## 2021-04-08 PROCEDURE — 99214 OFFICE O/P EST MOD 30 MIN: CPT | Mod: 25

## 2021-04-08 PROCEDURE — 31231 NASAL ENDOSCOPY DX: CPT

## 2021-04-08 RX ORDER — MUPIROCIN 2 G/100G
2 CREAM TOPICAL TWICE DAILY
Qty: 2 | Refills: 3 | Status: ACTIVE | COMMUNITY
Start: 2021-04-08 | End: 1900-01-01

## 2021-04-08 NOTE — PHYSICAL EXAM
[Midline] : trachea located in midline position [Normal] : no rashes [FreeTextEntry1] : right facial droop

## 2021-04-08 NOTE — HISTORY OF PRESENT ILLNESS
[FreeTextEntry1] : Patient presents today following up on perf of right tympanic membrane and epistaxis. Patient denies any otalgia, discharge, hearing changes. No epistaxis recently. Does not blow his nose as often anymore. Was told on last visit that he has a perforated right TM and would like more information regarding fixing that.  Pt has decreased bleeding with the nose only when blowing it .

## 2021-04-08 NOTE — REVIEW OF SYSTEMS
[Patient Intake Form Reviewed] : Patient intake form was reviewed [Negative] : Heme/Lymph [FreeTextEntry1] : All other ROS negative except what is noted in HPI.

## 2021-04-08 NOTE — PROCEDURE
[Epistaxis] : evaluation of epistaxis [Nasal Septum Mucosa Bleeding Right] : bleeding on the right [Normal] : the paranasal sinuses had no abnormalities

## 2021-04-21 ENCOUNTER — OUTPATIENT (OUTPATIENT)
Dept: OUTPATIENT SERVICES | Facility: HOSPITAL | Age: 71
LOS: 1 days | Discharge: HOME | End: 2021-04-21
Payer: MEDICARE

## 2021-04-21 ENCOUNTER — RESULT REVIEW (OUTPATIENT)
Age: 71
End: 2021-04-21

## 2021-04-21 DIAGNOSIS — Z95.1 PRESENCE OF AORTOCORONARY BYPASS GRAFT: Chronic | ICD-10-CM

## 2021-04-21 DIAGNOSIS — H90.11 CONDUCTIVE HEARING LOSS, UNILATERAL, RIGHT EAR, WITH UNRESTRICTED HEARING ON THE CONTRALATERAL SIDE: ICD-10-CM

## 2021-04-21 PROCEDURE — 70480 CT ORBIT/EAR/FOSSA W/O DYE: CPT | Mod: 26,MH

## 2021-04-22 ENCOUNTER — NON-APPOINTMENT (OUTPATIENT)
Age: 71
End: 2021-04-22

## 2021-04-28 ENCOUNTER — APPOINTMENT (OUTPATIENT)
Dept: OTOLARYNGOLOGY | Facility: CLINIC | Age: 71
End: 2021-04-28
Payer: MEDICARE

## 2021-04-28 DIAGNOSIS — H90.11 CONDUCTIVE HEARING LOSS, UNILATERAL, RIGHT EAR, WITH UNRESTRICTED HEARING ON THE CONTRALATERAL SIDE: ICD-10-CM

## 2021-04-28 PROCEDURE — 99214 OFFICE O/P EST MOD 30 MIN: CPT | Mod: 25

## 2021-04-28 PROCEDURE — 31231 NASAL ENDOSCOPY DX: CPT

## 2021-04-28 RX ORDER — OFLOXACIN OTIC 3 MG/ML
0.3 SOLUTION AURICULAR (OTIC) TWICE DAILY
Qty: 1 | Refills: 2 | Status: ACTIVE | COMMUNITY
Start: 2021-04-28 | End: 1900-01-01

## 2021-04-28 RX ORDER — BACITRACIN ZINC 500 [USP'U]/G
500 OINTMENT TOPICAL 3 TIMES DAILY
Qty: 1 | Refills: 3 | Status: ACTIVE | COMMUNITY
Start: 2021-04-28 | End: 1900-01-01

## 2021-04-28 RX ORDER — MONTELUKAST 10 MG/1
10 TABLET, FILM COATED ORAL DAILY
Qty: 30 | Refills: 6 | Status: ACTIVE | COMMUNITY
Start: 2021-04-28 | End: 1900-01-01

## 2021-04-28 NOTE — DATA REVIEWED
[de-identified] : \par \par EXAM: CT ORBITS\par \par \par PROCEDURE DATE: 04/21/2021\par \par \par \par \par INTERPRETATION: Clinical History / Reason for exam: Conductive hearing loss, right ear.\par \par Technique: CT of the temporal bones without contrast. Contiguous unenhanced CT axial images of the temporal bones without intravenous contrast, with targeted axial, coronal and sagittal reformats of each side.\par \par Correlation made with maxillofacial CT dated 12/22/2020, MRI brain and IACs dated 12/21/2020.\par \par Findings:\par \par Right side: The external auditory canal appears normal. There is thickening and retraction of the right tympanic membrane. Minimal soft tissue opacity is noted within the right middle ear cavity and the epitympanic space. The ossicles are intact without evidence of erosion. The inner ear structures appear morphologically unremarkable. The right mastoid air cells are well developed and partially opacified. The vascular structures and course and caliber of the facial nerve appear normal.\par \par Left side: The external auditory canal appears normal. The middle ear cavity is clear. The ossicles are intact without evidence of erosion. The inner ear structures appear morphologically unremarkable. The left mastoid air cells are well developed and well aerated. The vascular structures and course and caliber of the facial nerve appear normal.\par \par IMPRESSION:\par \par 1. Right side: Mild thickening and retraction of the tympanic membrane. Minimal soft tissue opacity within the middle ear cavity. Partial opacification of the mastoid air cells. No bony erosions demonstrated.\par \par 2. Left side: Unremarkable left temporal bone.\par \par \par \par \par \par RITO FINLEY MD; Attending Radiologist\par This document has been electronically signed. Apr 22 2021 10:11AM

## 2021-04-28 NOTE — HISTORY OF PRESENT ILLNESS
[FreeTextEntry1] : Patient presents today following up on epistaxis, right ear mass. No recent bleeding. Only when blowing nose. Patient here to discuss CT results.

## 2021-04-28 NOTE — PHYSICAL EXAM
[Normal] : mucosa is normal [Midline] : trachea located in midline position [de-identified] : thickened and drainage

## 2021-05-06 ENCOUNTER — APPOINTMENT (OUTPATIENT)
Dept: OTOLARYNGOLOGY | Facility: CLINIC | Age: 71
End: 2021-05-06
Payer: MEDICARE

## 2021-05-06 DIAGNOSIS — H93.8X1 OTHER SPECIFIED DISORDERS OF RIGHT EAR: ICD-10-CM

## 2021-05-06 DIAGNOSIS — R04.0 EPISTAXIS: ICD-10-CM

## 2021-05-06 PROCEDURE — 99214 OFFICE O/P EST MOD 30 MIN: CPT | Mod: 25

## 2021-05-06 PROCEDURE — 31238 NSL/SINS NDSC SRG NSL HEMRRG: CPT | Mod: 50

## 2021-05-06 NOTE — HISTORY OF PRESENT ILLNESS
[Mastoid] : mastoid surgery [FreeTextEntry1] : PAtient presents today following up epistaxis, no further bleeding since last visit. using bacitracin daily. \par following up on right ear mass Ct temporal bone was unremarkable on the left right shows opacity  .   Still taking amoxicillin and ear drops. Feeling better. reports no otorrhea.

## 2021-05-06 NOTE — PROCEDURE
[Epistaxis] : evaluation of epistaxis [Nasal Septum Mucosa Bleeding] : bilateral bleeding [Cauterized w/Silver Nitrate] : the bleeding was cauterized with silver nitrate [Normal] : the paranasal sinuses had no abnormalities

## 2021-05-06 NOTE — PHYSICAL EXAM
[Bleeding] : bleeding is present [Midline] : trachea located in midline position [Normal] : temporomandibular joint is normal [de-identified] : no drainage noted [de-identified] : vessels identified right septum

## 2021-06-22 ENCOUNTER — APPOINTMENT (OUTPATIENT)
Dept: OTOLARYNGOLOGY | Facility: CLINIC | Age: 71
End: 2021-06-22
Payer: MEDICARE

## 2021-06-22 DIAGNOSIS — H66.90 OTITIS MEDIA, UNSPECIFIED, UNSPECIFIED EAR: ICD-10-CM

## 2021-06-22 DIAGNOSIS — H61.20 IMPACTED CERUMEN, UNSPECIFIED EAR: ICD-10-CM

## 2021-06-22 DIAGNOSIS — J30.9 ALLERGIC RHINITIS, UNSPECIFIED: ICD-10-CM

## 2021-06-22 PROCEDURE — 31231 NASAL ENDOSCOPY DX: CPT

## 2021-06-22 PROCEDURE — 99213 OFFICE O/P EST LOW 20 MIN: CPT | Mod: 25

## 2021-06-22 PROCEDURE — 69210 REMOVE IMPACTED EAR WAX UNI: CPT

## 2021-06-22 RX ORDER — CIPROFLOXACIN AND DEXAMETHASONE 3; 1 MG/ML; MG/ML
0.3-0.1 SUSPENSION/ DROPS AURICULAR (OTIC) TWICE DAILY
Qty: 2 | Refills: 3 | Status: ACTIVE | COMMUNITY
Start: 2021-06-22 | End: 1900-01-01

## 2021-06-22 RX ORDER — AMOXICILLIN AND CLAVULANATE POTASSIUM 875; 125 MG/1; MG/1
875-125 TABLET, COATED ORAL
Qty: 28 | Refills: 0 | Status: DISCONTINUED | COMMUNITY
Start: 2021-04-28 | End: 2021-06-22

## 2021-06-22 NOTE — CONSULT LETTER
[Please see my note below.] : Please see my note below. [FreeTextEntry2] : Dear PAOLA SERVIN  [FreeTextEntry1] : Thank you for allowing me to participate in the care of YAHIR OSPINA .\par Please see the attached visit note.\par \par \par \par Jerald Alvarez\par Otology\par Medical Director of [facility]\par Department of Otolaryngology\par [facility]

## 2021-06-22 NOTE — PHYSICAL EXAM
[Nasal Endoscopy Performed] : nasal endoscopy was performed, see procedure section for findings [Midline] : trachea located in midline position [FreeTextEntry1] : Microscopic ear exam with cerumen debridement:\par \par Right ear: Obstructing cerumen was debrided from the ear canal using suction, and curet.  The ear canal was within normal limits.  Moisture at the level of the tympanic membrane removed with suction. The tympanic membrane was intact but partially replaced by mature granulation/inflammation. No mass lesion.\par \par Left ear: The ear canal was patent and nonobstructed.  The tympanic membrane was intact and noninflamed. [de-identified] : enlarged [Normal] : salivary glands are normal [FreeTextEntry2] : right facial paresis 4/5

## 2021-06-22 NOTE — ASSESSMENT
[FreeTextEntry1] : History of right ear process and facial paresis several months ago. This appears to have mostly resolved. Inflammation and partial paresis remains. I have reviewed this with the patient in detail. I have recommended otic drops the right ear. I recommended the avoidance of manipulation of the ear canal which apparently he has done for ear cleaning in the past.\par \par Followup recommended in 3 months with repeat audiometry.\par \par Moisturizing nasal spray recommended.

## 2021-06-22 NOTE — DATA REVIEWED
[de-identified] : previous audiometry reviewed [de-identified] : CT scan of the temporal bone April 2021 reviewed

## 2021-06-22 NOTE — PROCEDURE
[FreeTextEntry6] : PROCEDURE:  NASAL ENDOSCOPY  \par \par Surgeon: Dr. Alvarez\par Indication: Chronic Rhinitis\par Anesthetic: Topical lidocaine and Afrin\par Procedure: The patient was placed in a sitting position.  Following application of the topical anesthetic and decongestant, exam was performed with a flexible endoscope.  The following anatomic sites were directly examined bilaterally in a sequential fashion:\par The scope was introduced in the nasal passage between the middle and inferior turbinates to exam the inferior portion of the middle meatus and the fontanelle, as well as the maxillary ostia. Next, the scope was passed medially and posteriorly to the middle turbinates to examine the sphenoethmoid recess and the superior turbinate region.\par \par Nasopharynx: no masses, choanae patent, no adenoid tissue\par \par The following findings were noted:\par \par bilateral turbinate hypertrophy without purulence or polypoid disease.

## 2021-06-22 NOTE — HISTORY OF PRESENT ILLNESS
[de-identified] : YAHIR OSPINA has a history of right facial paralysis in December 2020. Later hospitalized and dx'd with Covid infection. Denies complete paralysis, but partially persists still.  No ear pain. Otorrhea and hearing loss reported at the time of the facial paresis.   NO prior ear disease. \par Also reports nasal airway obtrusion and epistaxis that is mostly improved.

## 2021-09-28 ENCOUNTER — APPOINTMENT (OUTPATIENT)
Dept: OTOLARYNGOLOGY | Facility: CLINIC | Age: 71
End: 2021-09-28
Payer: MEDICARE

## 2021-09-28 DIAGNOSIS — H90.3 SENSORINEURAL HEARING LOSS, BILATERAL: ICD-10-CM

## 2021-09-28 PROCEDURE — 92504 EAR MICROSCOPY EXAMINATION: CPT

## 2021-09-28 PROCEDURE — 92557 COMPREHENSIVE HEARING TEST: CPT

## 2021-09-28 PROCEDURE — 99213 OFFICE O/P EST LOW 20 MIN: CPT | Mod: 25

## 2021-09-28 PROCEDURE — 92550 TYMPANOMETRY & REFLEX THRESH: CPT

## 2021-09-28 NOTE — ASSESSMENT
[FreeTextEntry1] : Improve right ear inflammation and facial paresis. No intervention warranted at this time. Continued clinical monitoring advised.

## 2021-09-28 NOTE — DATA REVIEWED
[de-identified] : In light of today's symptoms, Complete audiometry was ordered and completed today. I have interpreted these results and reviewed them in detail with the patient.\par \par Improved hearing thresholds in the right ear

## 2021-09-28 NOTE — HISTORY OF PRESENT ILLNESS
[de-identified] : YAHIR OSPINA has a history of right facial paralysis in December 2020. Later hospitalized and dx'd with Covid infection. Denies complete paralysis, but partially persists still.  No ear pain. Otorrhea and hearing loss reported at the time of the facial paresis.   NO prior ear disease. \par Also reports nasal airway obtrusion and epistaxis that is mostly improved. [FreeTextEntry1] : 09/28/2021 \par No ear pain or otorrhea. improved hearing reported.  Some eyelid twitich reported.

## 2021-09-28 NOTE — CONSULT LETTER
[Please see my note below.] : Please see my note below. [FreeTextEntry2] : Dear PAOLA SERVIN  [FreeTextEntry1] : Thank you for allowing me to participate in the care of YAHIR OSPINA .\par Please see the attached visit note.\par \par \par \par Jerald Alvarez\par Otology\par Medical Director of [facility]\par Department of Otolaryngology\par [facility]

## 2021-09-28 NOTE — PHYSICAL EXAM
[Normal] : temporomandibular joint is normal [FreeTextEntry1] : Procedure: Microscopic Ear Exam\par \par Left ear:  Ear canal intact without inflammation or lesion.  \par Tympanic membrane intact without inflammation.\par \par Right ear:  Ear canal intact without inflammation or lesion.  \par Tympanic membrane intact without inflammation.\par \par  [de-identified] : 2/6 right facial paresis with mild mass motion

## 2021-11-16 ENCOUNTER — APPOINTMENT (OUTPATIENT)
Dept: NEUROLOGY | Facility: CLINIC | Age: 71
End: 2021-11-16
Payer: MEDICARE

## 2021-11-16 VITALS
WEIGHT: 200 LBS | DIASTOLIC BLOOD PRESSURE: 71 MMHG | TEMPERATURE: 97.9 F | OXYGEN SATURATION: 98 % | HEART RATE: 70 BPM | BODY MASS INDEX: 33.32 KG/M2 | SYSTOLIC BLOOD PRESSURE: 120 MMHG | HEIGHT: 65 IN

## 2021-11-16 DIAGNOSIS — G51.0 BELL'S PALSY: ICD-10-CM

## 2021-11-16 PROCEDURE — 99213 OFFICE O/P EST LOW 20 MIN: CPT

## 2021-11-16 NOTE — ASSESSMENT
[FreeTextEntry1] : Right bell's with good recovery.  Mild residual asymmetry.\par Nothing further needed at this time.\par Patient with follow up on as needed basis.

## 2021-11-16 NOTE — HISTORY OF PRESENT ILLNESS
[FreeTextEntry1] : Pt here for f/u of right Sherwood' Palsy.  Onset December 2020.\par Able to close eyes fully.  Mild asymmetry of smile. \par Is feeling good about degree of improvement.\par No dribbling of food.  Had tooth extracted left side of mouth and sometimes bites cheek on left side related to extraction.\par \par No other concerns today.

## 2021-11-16 NOTE — PHYSICAL EXAM
[FreeTextEntry1] : Able to close eyes fully.\par Mild asymmetry at rest and with smile\par Speech is not dysarthric.\par \par full strength in upper and lower extremities.\par normal gait

## 2022-10-11 NOTE — STROKE CODE NOTE - NIH STROKE SCALE: 8. SENSORY, QM
(0) Normal; no sensory loss Hydroquinone Counseling:  Patient advised that medication may result in skin irritation, lightening (hypopigmentation), dryness, and burning.  In the event of skin irritation, the patient was advised to reduce the amount of the drug applied or use it less frequently.  Rarely, spots that are treated with hydroquinone can become darker (pseudoochronosis).  Should this occur, patient instructed to stop medication and call the office. The patient verbalized understanding of the proper use and possible adverse effects of hydroquinone.  All of the patient's questions and concerns were addressed.

## 2022-12-03 ENCOUNTER — NON-APPOINTMENT (OUTPATIENT)
Age: 72
End: 2022-12-03

## 2023-02-01 ENCOUNTER — NON-APPOINTMENT (OUTPATIENT)
Age: 73
End: 2023-02-01

## 2023-08-14 NOTE — H&P ADULT - HISTORY OF PRESENT ILLNESS
67 yo M with pmhx of CAD s/p CABG, DM, HTN presents for L nare epistaxis s/p forceful blowing of nose one hour prior to presentation. Pt seen by ENT in ED and required a posterior packing due to uncontrolled bleeding which was soaked in TXA. Denies fevers chills, headache, dizziness, N/V, SOB, CP. admits to feeling tired. English

## 2023-10-14 NOTE — ED PROVIDER NOTE - PHYSICAL EXAMINATION
CONST: Well appearing in NAD  EYES: PERRL, EOMI, Sclera and conjunctiva clear.   ENT: left nostril with fresh clot hear septum. No active bleeding noted. No bleeding in post OP  MS: Normal ROM in all extremities. No midline spinal tenderness.  SKIN: Warm, dry, no acute rashes. Good turgor  NEURO: A&Ox3, No focal deficits. Strength 5/5 with no sensory deficits. Steady gait yes

## 2024-06-13 NOTE — ED ADULT NURSE NOTE - TEMPLATE LIST FOR HEAD TO TOE ASSESSMENT
Rash: Likely eczema, offered steroid topical ointment however patient declined and would rather do oral treatment.  Will refer to dermatology  
General

## 2025-09-17 ENCOUNTER — NON-APPOINTMENT (OUTPATIENT)
Age: 75
End: 2025-09-17

## 2025-09-18 ENCOUNTER — LABORATORY RESULT (OUTPATIENT)
Age: 75
End: 2025-09-18

## 2025-09-18 ENCOUNTER — APPOINTMENT (OUTPATIENT)
Dept: UROLOGY | Facility: CLINIC | Age: 75
End: 2025-09-18
Payer: MEDICARE

## 2025-09-18 VITALS
SYSTOLIC BLOOD PRESSURE: 156 MMHG | HEIGHT: 66 IN | BODY MASS INDEX: 32.14 KG/M2 | HEART RATE: 67 BPM | DIASTOLIC BLOOD PRESSURE: 72 MMHG | WEIGHT: 200 LBS | OXYGEN SATURATION: 96 % | RESPIRATION RATE: 18 BRPM

## 2025-09-18 DIAGNOSIS — Z86.39 PERSONAL HISTORY OF OTHER ENDOCRINE, NUTRITIONAL AND METABOLIC DISEASE: ICD-10-CM

## 2025-09-18 DIAGNOSIS — Z86.79 PERSONAL HISTORY OF OTHER DISEASES OF THE CIRCULATORY SYSTEM: ICD-10-CM

## 2025-09-18 DIAGNOSIS — R97.20 ELEVATED PROSTATE, SPECIFIC ANTIGEN [PSA]: ICD-10-CM

## 2025-09-18 LAB
BILIRUB UR QL STRIP: NORMAL
COLLECTION METHOD: NORMAL
GLUCOSE UR-MCNC: 100
HCG UR QL: 0.2 EU/DL
HGB UR QL STRIP.AUTO: NORMAL
KETONES UR-MCNC: NORMAL
LEUKOCYTE ESTERASE UR QL STRIP: NORMAL
NITRITE UR QL STRIP: NORMAL
PH UR STRIP: 6.5
PROT UR STRIP-MCNC: 300
SP GR UR STRIP: 1.02

## 2025-09-18 PROCEDURE — 99202 OFFICE O/P NEW SF 15 MIN: CPT
